# Patient Record
Sex: MALE | Race: OTHER | HISPANIC OR LATINO | URBAN - METROPOLITAN AREA
[De-identification: names, ages, dates, MRNs, and addresses within clinical notes are randomized per-mention and may not be internally consistent; named-entity substitution may affect disease eponyms.]

---

## 2018-11-16 ENCOUNTER — OUTPATIENT (OUTPATIENT)
Dept: OUTPATIENT SERVICES | Facility: HOSPITAL | Age: 48
LOS: 1 days | Discharge: HOME | End: 2018-11-16
Payer: MEDICAID

## 2018-11-16 VITALS
WEIGHT: 212.08 LBS | DIASTOLIC BLOOD PRESSURE: 85 MMHG | OXYGEN SATURATION: 99 % | HEART RATE: 74 BPM | SYSTOLIC BLOOD PRESSURE: 135 MMHG | HEIGHT: 68 IN | RESPIRATION RATE: 16 BRPM | TEMPERATURE: 98 F

## 2018-11-16 DIAGNOSIS — K40.20 BILATERAL INGUINAL HERNIA, WITHOUT OBSTRUCTION OR GANGRENE, NOT SPECIFIED AS RECURRENT: Chronic | ICD-10-CM

## 2018-11-16 DIAGNOSIS — M75.41 IMPINGEMENT SYNDROME OF RIGHT SHOULDER: ICD-10-CM

## 2018-11-16 DIAGNOSIS — S43.491D OTHER SPRAIN OF RIGHT SHOULDER JOINT, SUBSEQUENT ENCOUNTER: ICD-10-CM

## 2018-11-16 LAB
ANION GAP SERPL CALC-SCNC: 7 MMOL/L — SIGNIFICANT CHANGE UP (ref 5–17)
APTT BLD: 29 SEC — SIGNIFICANT CHANGE UP (ref 27.5–36.3)
BASOPHILS # BLD AUTO: 0.08 K/UL — SIGNIFICANT CHANGE UP (ref 0–0.2)
BASOPHILS NFR BLD AUTO: 0.8 % — SIGNIFICANT CHANGE UP (ref 0–2)
BUN SERPL-MCNC: 24 MG/DL — HIGH (ref 7–23)
CALCIUM SERPL-MCNC: 9.4 MG/DL — SIGNIFICANT CHANGE UP (ref 8.5–10.1)
CHLORIDE SERPL-SCNC: 105 MMOL/L — SIGNIFICANT CHANGE UP (ref 96–108)
CO2 SERPL-SCNC: 27 MMOL/L — SIGNIFICANT CHANGE UP (ref 22–31)
CREAT SERPL-MCNC: 1.57 MG/DL — HIGH (ref 0.5–1.3)
EOSINOPHIL # BLD AUTO: 0.1 K/UL — SIGNIFICANT CHANGE UP (ref 0–0.5)
EOSINOPHIL NFR BLD AUTO: 1 % — SIGNIFICANT CHANGE UP (ref 0–6)
GLUCOSE SERPL-MCNC: 95 MG/DL — SIGNIFICANT CHANGE UP (ref 70–99)
HCT VFR BLD CALC: 47.7 % — SIGNIFICANT CHANGE UP (ref 39–50)
HGB BLD-MCNC: 15.9 G/DL — SIGNIFICANT CHANGE UP (ref 13–17)
IMM GRANULOCYTES NFR BLD AUTO: 0.3 % — SIGNIFICANT CHANGE UP (ref 0–1.5)
INR BLD: 1.08 RATIO — SIGNIFICANT CHANGE UP (ref 0.88–1.16)
LYMPHOCYTES # BLD AUTO: 2.4 K/UL — SIGNIFICANT CHANGE UP (ref 1–3.3)
LYMPHOCYTES # BLD AUTO: 24.3 % — SIGNIFICANT CHANGE UP (ref 13–44)
MCHC RBC-ENTMCNC: 30.2 PG — SIGNIFICANT CHANGE UP (ref 27–34)
MCHC RBC-ENTMCNC: 33.3 GM/DL — SIGNIFICANT CHANGE UP (ref 32–36)
MCV RBC AUTO: 90.7 FL — SIGNIFICANT CHANGE UP (ref 80–100)
MONOCYTES # BLD AUTO: 0.76 K/UL — SIGNIFICANT CHANGE UP (ref 0–0.9)
MONOCYTES NFR BLD AUTO: 7.7 % — SIGNIFICANT CHANGE UP (ref 2–14)
NEUTROPHILS # BLD AUTO: 6.52 K/UL — SIGNIFICANT CHANGE UP (ref 1.8–7.4)
NEUTROPHILS NFR BLD AUTO: 65.9 % — SIGNIFICANT CHANGE UP (ref 43–77)
NRBC # BLD: 0 /100 WBCS — SIGNIFICANT CHANGE UP (ref 0–0)
PLATELET # BLD AUTO: 253 K/UL — SIGNIFICANT CHANGE UP (ref 150–400)
POTASSIUM SERPL-MCNC: 4.3 MMOL/L — SIGNIFICANT CHANGE UP (ref 3.5–5.3)
POTASSIUM SERPL-SCNC: 4.3 MMOL/L — SIGNIFICANT CHANGE UP (ref 3.5–5.3)
PROTHROM AB SERPL-ACNC: 12 SEC — SIGNIFICANT CHANGE UP (ref 10–12.9)
RBC # BLD: 5.26 M/UL — SIGNIFICANT CHANGE UP (ref 4.2–5.8)
RBC # FLD: 12.9 % — SIGNIFICANT CHANGE UP (ref 10.3–14.5)
SODIUM SERPL-SCNC: 139 MMOL/L — SIGNIFICANT CHANGE UP (ref 135–145)
WBC # BLD: 9.89 K/UL — SIGNIFICANT CHANGE UP (ref 3.8–10.5)
WBC # FLD AUTO: 9.89 K/UL — SIGNIFICANT CHANGE UP (ref 3.8–10.5)

## 2018-11-16 PROCEDURE — 93010 ELECTROCARDIOGRAM REPORT: CPT

## 2018-11-16 NOTE — H&P PST ADULT - ASSESSMENT
48 year old male presents to PST for  right shoulder arthroscopy with rotator cuff repair with manipulation under anesthesia     Plan:  1. PST instructions given ; NPO post midnight   2. Labs drawn as per surgeon request   3. EZ wash instructions given   4. Medical Evaluation with Dr Araujo

## 2018-11-16 NOTE — H&P PST ADULT - NSANTHOSAYNRD_GEN_A_CORE
No. REE screening performed.  STOP BANG Legend: 0-2 = LOW Risk; 3-4 = INTERMEDIATE Risk; 5-8 = HIGH Risk

## 2018-11-16 NOTE — H&P PST ADULT - HISTORY OF PRESENT ILLNESS
48 year old male PMH of glaucoma, hemachromatosis recently diagnosed 2 months ago  ( phlebotomy monthly for now) ; c/o right shoulder pain with limited ROM; MRI done  he presents to [facility] for  right shoulder arthroscopy with rotator cuff repair with manipulation under anesthesia

## 2018-11-21 PROBLEM — E83.119 HEMOCHROMATOSIS, UNSPECIFIED: Chronic | Status: ACTIVE | Noted: 2018-11-16

## 2018-11-21 PROBLEM — H40.9 UNSPECIFIED GLAUCOMA: Chronic | Status: ACTIVE | Noted: 2018-11-16

## 2018-11-21 PROBLEM — M75.100 UNSPECIFIED ROTATOR CUFF TEAR OR RUPTURE OF UNSPECIFIED SHOULDER, NOT SPECIFIED AS TRAUMATIC: Chronic | Status: ACTIVE | Noted: 2018-11-16

## 2018-11-26 ENCOUNTER — RESULT REVIEW (OUTPATIENT)
Age: 48
End: 2018-11-26

## 2018-11-26 ENCOUNTER — OUTPATIENT (OUTPATIENT)
Dept: OUTPATIENT SERVICES | Facility: HOSPITAL | Age: 48
LOS: 1 days | Discharge: HOME | End: 2018-11-26
Payer: MEDICAID

## 2018-11-26 VITALS
SYSTOLIC BLOOD PRESSURE: 137 MMHG | WEIGHT: 212.08 LBS | RESPIRATION RATE: 15 BRPM | OXYGEN SATURATION: 100 % | HEIGHT: 68 IN | TEMPERATURE: 99 F | HEART RATE: 64 BPM | DIASTOLIC BLOOD PRESSURE: 88 MMHG

## 2018-11-26 VITALS
SYSTOLIC BLOOD PRESSURE: 136 MMHG | TEMPERATURE: 98 F | HEART RATE: 76 BPM | OXYGEN SATURATION: 96 % | DIASTOLIC BLOOD PRESSURE: 79 MMHG | RESPIRATION RATE: 16 BRPM

## 2018-11-26 DIAGNOSIS — K40.20 BILATERAL INGUINAL HERNIA, WITHOUT OBSTRUCTION OR GANGRENE, NOT SPECIFIED AS RECURRENT: Chronic | ICD-10-CM

## 2018-11-26 PROCEDURE — 88304 TISSUE EXAM BY PATHOLOGIST: CPT | Mod: 26

## 2018-11-26 NOTE — PROGRESS NOTE ADULT - SUBJECTIVE AND OBJECTIVE BOX
__Right____ Interscalene Nerve Block Note:  Time out performed, pt awake and alert, sterile prep with chlorhexidine and drape, ultrasound-guided, 21 G 2" echostim needle, good visualization of needle and nerve at all times, 30cc of 0.5% Ropivacaine injected easily, no heme after aspirating every 5cc, no intraneural injection, no paresthesia.  Procedure well tolerated without complications.

## 2018-11-26 NOTE — ASU DISCHARGE PLAN (ADULT/PEDIATRIC). - MEDICATION SUMMARY - MEDICATIONS TO CHANGE
I will SWITCH the dose or number of times a day I take the medications listed below when I get home from the hospital:    oxyCODONE-acetaminophen 5 mg-325 mg oral tablet  -- 1 tab po daily

## 2018-11-26 NOTE — ASU DISCHARGE PLAN (ADULT/PEDIATRIC). - SPECIAL INSTRUCTIONS
Follow up with Dr Aguirre in 10-14 days. Call office for appointment. Take medications as prescribed. Rest, ice, and elevate affected extremity. Keep dressing clean, dry, and intact. Can replace dressing in 72 hours. Place adhesive bandage over incision sites. Nonweight bearing affected extremity with shoulder immobilizer.

## 2018-11-26 NOTE — ASU DISCHARGE PLAN (ADULT/PEDIATRIC). - MEDICATION SUMMARY - MEDICATIONS TO TAKE
I will START or STAY ON the medications listed below when I get home from the hospital:    oxyCODONE-acetaminophen 5 mg-325 mg oral tablet  -- 1 tab(s) by mouth every 4 hours, As Needed -for severe pain MDD:6 tabs per day  -- Caution federal law prohibits the transfer of this drug to any person other  than the person for whom it was prescribed.  May cause drowsiness.  Alcohol may intensify this effect.  Use care when operating dangerous machinery.  This prescription cannot be refilled.  This product contains acetaminophen.  Do not use  with any other product containing acetaminophen to prevent possible liver damage.  Using more of this medication than prescribed may cause serious breathing problems.    -- Indication: For pain    nortriptyline 25 mg oral capsule  -- 1 cap(s) by mouth 1 times a day  -- Indication: For home med    tiZANidine 4 mg oral tablet  -- 1 tab(s) by mouth daily  -- Indication: For home med

## 2018-11-28 LAB — SURGICAL PATHOLOGY FINAL REPORT - CH: SIGNIFICANT CHANGE UP

## 2018-11-29 DIAGNOSIS — M19.011 PRIMARY OSTEOARTHRITIS, RIGHT SHOULDER: ICD-10-CM

## 2018-11-29 DIAGNOSIS — M66.321 SPONTANEOUS RUPTURE OF FLEXOR TENDONS, RIGHT UPPER ARM: ICD-10-CM

## 2018-11-29 DIAGNOSIS — M75.121 COMPLETE ROTATOR CUFF TEAR OR RUPTURE OF RIGHT SHOULDER, NOT SPECIFIED AS TRAUMATIC: ICD-10-CM

## 2018-11-29 DIAGNOSIS — M94.211 CHONDROMALACIA, RIGHT SHOULDER: ICD-10-CM

## 2018-11-29 DIAGNOSIS — E83.119 HEMOCHROMATOSIS, UNSPECIFIED: ICD-10-CM

## 2018-11-29 DIAGNOSIS — M75.41 IMPINGEMENT SYNDROME OF RIGHT SHOULDER: ICD-10-CM

## 2018-11-29 DIAGNOSIS — H40.9 UNSPECIFIED GLAUCOMA: ICD-10-CM

## 2018-11-29 DIAGNOSIS — M75.51 BURSITIS OF RIGHT SHOULDER: ICD-10-CM

## 2019-01-06 ENCOUNTER — EMERGENCY (EMERGENCY)
Facility: HOSPITAL | Age: 49
LOS: 1 days | Discharge: HOME | End: 2019-01-06
Attending: EMERGENCY MEDICINE
Payer: COMMERCIAL

## 2019-01-06 VITALS
SYSTOLIC BLOOD PRESSURE: 137 MMHG | HEART RATE: 94 BPM | DIASTOLIC BLOOD PRESSURE: 90 MMHG | OXYGEN SATURATION: 97 % | TEMPERATURE: 98 F | WEIGHT: 214.95 LBS | RESPIRATION RATE: 18 BRPM | HEIGHT: 69 IN

## 2019-01-06 DIAGNOSIS — K40.20 BILATERAL INGUINAL HERNIA, WITHOUT OBSTRUCTION OR GANGRENE, NOT SPECIFIED AS RECURRENT: Chronic | ICD-10-CM

## 2019-01-06 LAB
ANION GAP SERPL CALC-SCNC: 14 MMOL/L — SIGNIFICANT CHANGE UP (ref 5–17)
BUN SERPL-MCNC: 24 MG/DL — HIGH (ref 8–20)
CALCIUM SERPL-MCNC: 9.6 MG/DL — SIGNIFICANT CHANGE UP (ref 8.6–10.2)
CHLORIDE SERPL-SCNC: 100 MMOL/L — SIGNIFICANT CHANGE UP (ref 98–107)
CK SERPL-CCNC: 75 U/L — SIGNIFICANT CHANGE UP (ref 30–200)
CO2 SERPL-SCNC: 25 MMOL/L — SIGNIFICANT CHANGE UP (ref 22–29)
CREAT SERPL-MCNC: 1.39 MG/DL — HIGH (ref 0.5–1.3)
GLUCOSE SERPL-MCNC: 129 MG/DL — HIGH (ref 70–115)
HCT VFR BLD CALC: 47.1 % — SIGNIFICANT CHANGE UP (ref 42–52)
HGB BLD-MCNC: 15.8 G/DL — SIGNIFICANT CHANGE UP (ref 14–18)
MCHC RBC-ENTMCNC: 31.1 PG — HIGH (ref 27–31)
MCHC RBC-ENTMCNC: 33.5 G/DL — SIGNIFICANT CHANGE UP (ref 32–36)
MCV RBC AUTO: 92.7 FL — SIGNIFICANT CHANGE UP (ref 80–94)
PLATELET # BLD AUTO: 166 K/UL — SIGNIFICANT CHANGE UP (ref 150–400)
POTASSIUM SERPL-MCNC: 4.6 MMOL/L — SIGNIFICANT CHANGE UP (ref 3.5–5.3)
POTASSIUM SERPL-SCNC: 4.6 MMOL/L — SIGNIFICANT CHANGE UP (ref 3.5–5.3)
RBC # BLD: 5.08 M/UL — SIGNIFICANT CHANGE UP (ref 4.6–6.2)
RBC # FLD: 13.6 % — SIGNIFICANT CHANGE UP (ref 11–15.6)
SODIUM SERPL-SCNC: 139 MMOL/L — SIGNIFICANT CHANGE UP (ref 135–145)
WBC # BLD: 13.5 K/UL — HIGH (ref 4.8–10.8)
WBC # FLD AUTO: 13.5 K/UL — HIGH (ref 4.8–10.8)

## 2019-01-06 PROCEDURE — 73590 X-RAY EXAM OF LOWER LEG: CPT

## 2019-01-06 PROCEDURE — 36415 COLL VENOUS BLD VENIPUNCTURE: CPT

## 2019-01-06 PROCEDURE — 93971 EXTREMITY STUDY: CPT | Mod: 26,LT

## 2019-01-06 PROCEDURE — 99284 EMERGENCY DEPT VISIT MOD MDM: CPT

## 2019-01-06 PROCEDURE — T1013: CPT

## 2019-01-06 PROCEDURE — 80048 BASIC METABOLIC PNL TOTAL CA: CPT

## 2019-01-06 PROCEDURE — 73590 X-RAY EXAM OF LOWER LEG: CPT | Mod: 26,LT

## 2019-01-06 PROCEDURE — 85027 COMPLETE CBC AUTOMATED: CPT

## 2019-01-06 PROCEDURE — 82550 ASSAY OF CK (CPK): CPT

## 2019-01-06 PROCEDURE — 93971 EXTREMITY STUDY: CPT

## 2019-01-06 RX ORDER — APIXABAN 2.5 MG/1
2 TABLET, FILM COATED ORAL
Qty: 28 | Refills: 0 | OUTPATIENT
Start: 2019-01-06 | End: 2019-01-12

## 2019-01-06 RX ORDER — ACETAMINOPHEN 500 MG
650 TABLET ORAL ONCE
Qty: 0 | Refills: 0 | Status: COMPLETED | OUTPATIENT
Start: 2019-01-06 | End: 2019-01-06

## 2019-01-06 RX ORDER — APIXABAN 2.5 MG/1
10 TABLET, FILM COATED ORAL ONCE
Qty: 0 | Refills: 0 | Status: COMPLETED | OUTPATIENT
Start: 2019-01-06 | End: 2019-01-06

## 2019-01-06 RX ADMIN — APIXABAN 10 MILLIGRAM(S): 2.5 TABLET, FILM COATED ORAL at 15:54

## 2019-01-06 NOTE — ED STATDOCS - PROGRESS NOTE DETAILS
Pt seen and evaluated. Agree with HPI/PE and plan Pt seen and evaluated. Pt with large, swollen and tender calf. NO crepitus, erythema or warmth. Pain worse with ankle extension. Denies injury, currently nonworking. Will add labs to include cpk for possible early compartment syndrome. XR to eval for subq air. Lab results noted. Cr noted 1.57 Nov 2018. Pt aware of renal insuff. +DVT Pt made aware- will start on Eliquis 10mg BID x 7 days and instructed to f/u with PMD/ Vascular for extended treatement. Pt educated on complications and instructed to return to ED with CP, SOB, or palp. Pt verbalizes understanding

## 2019-01-06 NOTE — ED STATDOCS - ATTENDING CONTRIBUTION TO CARE
Travis: I performed a face to face bedside interview with patient regarding history of present illness, review of symptoms and past medical history. I completed an independent physical exam and ordered tests/medications as needed.  I have discussed patient's plan of care with advanced care provider. The advanced care provider assisted in  executing the discussed plan. I was available for any questions or issues that may have arose during the execution of the plan of care.

## 2019-01-06 NOTE — ED STATDOCS - OBJECTIVE STATEMENT
47 y/o M pt with hx of hematochormatosis presents to ED c/o sudden left leg swelling and pain x 4 days. The swelling has progressed over the last few days; presented to urgent care and was told to present to ED for US of LE. Denies back pain, dysuria or tingling in legs. Denies history of blood clots. No recent travel. No further complaints at this time.

## 2019-01-06 NOTE — ED STATDOCS - PMH
Glaucoma    Hemochromatosis    Torn rotator cuff  right shoulder Glaucoma    Hemochromatosis    Renal insufficiency    Torn rotator cuff  right shoulder

## 2019-01-06 NOTE — ED ADULT TRIAGE NOTE - CHIEF COMPLAINT QUOTE
"I went to [facility] today for left calf pain and I feel like it might be a pinched nerve and they sent me here to get a sonogram of my leg. " Pt A & OX4,.

## 2019-01-06 NOTE — ED STATDOCS - NS ED ROS FT
ROS: No fever/chills.  No chest painNo SOB/cough/. No abdominal pain, N/V/D,No dysuria/frequency.  No headache. No Dizziness.    No rashes  No numbness/weakness + LE swelling and pain

## 2019-01-06 NOTE — ED STATDOCS - PHYSICAL EXAMINATION
Gen: No acute distress, non toxic  HEENT: Mucous membranes moist, pink conjunctivae, EOMI  CV: RRR  Resp: CTAB, normal rate and effort  GI: Abdomen soft, NT, ND. No rebound, no guarding  Neuro: A&O x 3, moving all 4 extremities  Musculoskeletal: Tenderness left calf, mild swelling, no erythema or warmth, full ROM and neurovascularly intact

## 2019-01-07 ENCOUNTER — MEDICATION RENEWAL (OUTPATIENT)
Age: 49
End: 2019-01-07

## 2019-01-14 ENCOUNTER — APPOINTMENT (OUTPATIENT)
Dept: VASCULAR SURGERY | Facility: CLINIC | Age: 49
End: 2019-01-14
Payer: MEDICAID

## 2019-01-14 VITALS
HEART RATE: 98 BPM | WEIGHT: 209 LBS | DIASTOLIC BLOOD PRESSURE: 77 MMHG | HEIGHT: 69 IN | BODY MASS INDEX: 30.96 KG/M2 | OXYGEN SATURATION: 97 % | TEMPERATURE: 98.5 F | SYSTOLIC BLOOD PRESSURE: 123 MMHG

## 2019-01-14 DIAGNOSIS — Z82.49 FAMILY HISTORY OF ISCHEMIC HEART DISEASE AND OTHER DISEASES OF THE CIRCULATORY SYSTEM: ICD-10-CM

## 2019-01-14 DIAGNOSIS — Z87.39 PERSONAL HISTORY OF OTHER DISEASES OF THE MUSCULOSKELETAL SYSTEM AND CONNECTIVE TISSUE: ICD-10-CM

## 2019-01-14 DIAGNOSIS — Z83.3 FAMILY HISTORY OF DIABETES MELLITUS: ICD-10-CM

## 2019-01-14 DIAGNOSIS — Z78.9 OTHER SPECIFIED HEALTH STATUS: ICD-10-CM

## 2019-01-14 PROBLEM — N28.9 DISORDER OF KIDNEY AND URETER, UNSPECIFIED: Chronic | Status: ACTIVE | Noted: 2019-01-06

## 2019-01-14 PROCEDURE — 99203 OFFICE O/P NEW LOW 30 MIN: CPT

## 2019-01-18 PROBLEM — Z82.49 FAMILY HISTORY OF HYPERTENSION: Status: ACTIVE | Noted: 2019-01-14

## 2019-01-18 PROBLEM — Z83.3 FAMILY HISTORY OF DIABETES MELLITUS: Status: ACTIVE | Noted: 2019-01-14

## 2019-01-18 PROBLEM — Z78.9 NON-SMOKER: Status: ACTIVE | Noted: 2019-01-14

## 2019-01-18 PROBLEM — Z87.39 HISTORY OF ARTHRITIS: Status: RESOLVED | Noted: 2019-01-14 | Resolved: 2019-01-18

## 2019-01-18 PROBLEM — Z82.49 FAMILY HISTORY OF MYOCARDIAL INFARCTION: Status: ACTIVE | Noted: 2019-01-14

## 2019-01-18 NOTE — PHYSICAL EXAM
[2+] : left 2+ [Ankle Swelling (On Exam)] : present [Ankle Swelling On The Left] : moderate [Alert] : alert [Oriented to Person] : oriented to person [Oriented to Place] : oriented to place [Oriented to Time] : oriented to time [Calm] : calm [Varicose Veins Of Lower Extremities] : not present [] : not present [de-identified] : SHILPA, NAD [de-identified] : EDU GILMORE [de-identified] : supple, midline [de-identified] : nonlabored breathing [de-identified] : vikyr [de-identified] : LLE with mild to moderate edema without discoloration or signs of phlegmasia. Palpable pedal pulses bilaterally [de-identified] : no gross focal motor or sensory deficits

## 2019-01-18 NOTE — HISTORY OF PRESENT ILLNESS
[FreeTextEntry1] : 48yoM with pmhx of hemochromatosis, arthritis, chronic back pain presenting to the office for DVT involving the left popliteal vein extending into the posterior tibial and peroneal veins. Also seen were thrombosed anterior intramuscular calf veins. Patient states this happened on Jan 1. He first noted that his left lower extremity was hurting but thought it was more of a muscle cramp. On Jan 6th, he went to the hospital where he was found to have a LLE DVT. Patient denies any recent travels. Re his hemochromatosis, donates blood transfususions monthly for therapy, with the next one due tomorrow. Today, he states his pain is still present, but it has significantly improved. He was prescribed eliquis 5mg BID and has since run out from his hospital discharge. Denies f/c/n/v/cp/sob

## 2019-02-27 ENCOUNTER — APPOINTMENT (OUTPATIENT)
Dept: RHEUMATOLOGY | Facility: CLINIC | Age: 49
End: 2019-02-27
Payer: MEDICAID

## 2019-02-27 VITALS
DIASTOLIC BLOOD PRESSURE: 66 MMHG | SYSTOLIC BLOOD PRESSURE: 126 MMHG | TEMPERATURE: 98.5 F | HEIGHT: 69 IN | WEIGHT: 220 LBS | OXYGEN SATURATION: 89 % | HEART RATE: 99 BPM | BODY MASS INDEX: 32.58 KG/M2 | RESPIRATION RATE: 17 BRPM

## 2019-02-27 DIAGNOSIS — H04.123 DRY EYE SYNDROME OF BILATERAL LACRIMAL GLANDS: ICD-10-CM

## 2019-02-27 DIAGNOSIS — R68.2 DRY MOUTH, UNSPECIFIED: ICD-10-CM

## 2019-02-27 PROCEDURE — 99244 OFF/OP CNSLTJ NEW/EST MOD 40: CPT | Mod: 25

## 2019-02-27 PROCEDURE — 36415 COLL VENOUS BLD VENIPUNCTURE: CPT

## 2019-04-12 LAB
A PHAGOCYTOPH IGG TITR SER IF: NORMAL TITER
ALBUMIN SERPL ELPH-MCNC: 4.5 G/DL
ALP BLD-CCNC: 91 U/L
ALT SERPL-CCNC: 28 U/L
ANA SER IF-ACNC: NEGATIVE
ANION GAP SERPL CALC-SCNC: 13 MMOL/L
AST SERPL-CCNC: 17 U/L
B BURGDOR AB SER QL IA: NEGATIVE
B BURGDOR AB SER-IMP: NEGATIVE
B BURGDOR IGM PATRN SER IB-IMP: NEGATIVE
B BURGDOR18/20KD IGM SER QL IB: NORMAL
B BURGDOR18KD IGG SER QL IB: NORMAL
B BURGDOR23KD IGG SER QL IB: NORMAL
B BURGDOR23KD IGM SER QL IB: NORMAL
B BURGDOR28KD AB SER QL IB: NORMAL
B BURGDOR28KD IGG SER QL IB: NORMAL
B BURGDOR30KD AB SER QL IB: NORMAL
B BURGDOR30KD IGG SER QL IB: NORMAL
B BURGDOR31KD IGG SER QL IB: NORMAL
B BURGDOR31KD IGM SER QL IB: NORMAL
B BURGDOR39KD IGG SER QL IB: NORMAL
B BURGDOR39KD IGM SER QL IB: NORMAL
B BURGDOR41KD IGG SER QL IB: PRESENT
B BURGDOR41KD IGM SER QL IB: NORMAL
B BURGDOR45KD AB SER QL IB: NORMAL
B BURGDOR45KD IGG SER QL IB: NORMAL
B BURGDOR58KD AB SER QL IB: NORMAL
B BURGDOR58KD IGG SER QL IB: NORMAL
B BURGDOR66KD IGG SER QL IB: PRESENT
B BURGDOR66KD IGM SER QL IB: NORMAL
B BURGDOR93KD IGG SER QL IB: NORMAL
B BURGDOR93KD IGM SER QL IB: NORMAL
B MICROTI IGG TITR SER: NORMAL TITER
BASOPHILS # BLD AUTO: 0.06 K/UL
BASOPHILS NFR BLD AUTO: 0.4 %
BILIRUB SERPL-MCNC: 0.2 MG/DL
BUN SERPL-MCNC: 15 MG/DL
CALCIUM SERPL-MCNC: 10.2 MG/DL
CCP AB SER IA-ACNC: <8 UNITS
CHLORIDE SERPL-SCNC: 101 MMOL/L
CO2 SERPL-SCNC: 28 MMOL/L
CREAT SERPL-MCNC: 1.23 MG/DL
CRP SERPL-MCNC: 0.42 MG/DL
E CHAFFEENSIS IGG TITR SER IF: NORMAL TITER
EOSINOPHIL # BLD AUTO: 0.02 K/UL
EOSINOPHIL NFR BLD AUTO: 0.1 %
ERYTHROCYTE [SEDIMENTATION RATE] IN BLOOD BY WESTERGREN METHOD: 34 MM/HR
FERRITIN SERPL-MCNC: 312 NG/ML
GLUCOSE SERPL-MCNC: 166 MG/DL
HCT VFR BLD CALC: 46.8 %
HGB BLD-MCNC: 14.6 G/DL
IMM GRANULOCYTES NFR BLD AUTO: 1.3 %
IRON SATN MFR SERPL: 22 %
IRON SERPL-MCNC: 64 UG/DL
LYMPHOCYTES # BLD AUTO: 2.07 K/UL
LYMPHOCYTES NFR BLD AUTO: 12.8 %
MAN DIFF?: NORMAL
MCHC RBC-ENTMCNC: 29.4 PG
MCHC RBC-ENTMCNC: 31.2 GM/DL
MCV RBC AUTO: 94.4 FL
MONOCYTES # BLD AUTO: 0.9 K/UL
MONOCYTES NFR BLD AUTO: 5.5 %
NEUTROPHILS # BLD AUTO: 12.96 K/UL
NEUTROPHILS NFR BLD AUTO: 79.9 %
PLATELET # BLD AUTO: 334 K/UL
POTASSIUM SERPL-SCNC: 5.2 MMOL/L
PROT SERPL-MCNC: 7 G/DL
RBC # BLD: 4.96 M/UL
RBC # FLD: 13.5 %
RF+CCP IGG SER-IMP: NEGATIVE
RHEUMATOID FACT SER QL: 14 IU/ML
SODIUM SERPL-SCNC: 141 MMOL/L
TIBC SERPL-MCNC: 295 UG/DL
UIBC SERPL-MCNC: 231 UG/DL
URATE SERPL-MCNC: 7.2 MG/DL
WBC # FLD AUTO: 16.22 K/UL

## 2019-04-15 ENCOUNTER — APPOINTMENT (OUTPATIENT)
Dept: VASCULAR SURGERY | Facility: CLINIC | Age: 49
End: 2019-04-15
Payer: MEDICAID

## 2019-04-15 VITALS
TEMPERATURE: 98.4 F | DIASTOLIC BLOOD PRESSURE: 85 MMHG | OXYGEN SATURATION: 97 % | WEIGHT: 221 LBS | HEIGHT: 69 IN | HEART RATE: 61 BPM | BODY MASS INDEX: 32.73 KG/M2 | SYSTOLIC BLOOD PRESSURE: 126 MMHG

## 2019-04-15 PROCEDURE — 93971 EXTREMITY STUDY: CPT

## 2019-04-15 PROCEDURE — 99214 OFFICE O/P EST MOD 30 MIN: CPT

## 2019-04-19 NOTE — HISTORY OF PRESENT ILLNESS
[de-identified] : Last seen 3 months ago in January 2019. In interval, he's doing well. Has no complaints. He continues on eliquis and has been compliant. He denies any LE pain or swelling. He continues with his monthly therapeutic blood transfusions for his hemochromatosis. He saw a rheumatologist a few weeks back for chronic joint arthralgias and is pending a work up. [FreeTextEntry1] : 48yoM with pmhx of hemochromatosis, arthritis, chronic back pain presenting to the office for DVT involving the left popliteal vein extending into the posterior tibial and peroneal veins. Also seen were thrombosed anterior intramuscular calf veins. Patient states this happened on Jan 1. He first noted that his left lower extremity was hurting but thought it was more of a muscle cramp. On Jan 6th, he went to the hospital where he was found to have a LLE DVT. Patient denies any recent travels. Re his hemochromatosis, donates blood transfusions monthly for therapy, with the next one due tomorrow. Today, he states his pain is still present, but it has significantly improved. He was prescribed eliquis 5mg BID and has since run out from his hospital discharge. Denies f/c/n/v/cp/sob

## 2019-04-19 NOTE — PHYSICAL EXAM
[Ankle Swelling (On Exam)] : present [2+] : right 2+ [Ankle Swelling On The Left] : of the left ankle [Alert] : alert [Oriented to Person] : oriented to person [Oriented to Place] : oriented to place [Oriented to Time] : oriented to time [Calm] : calm [Ankle Swelling On The Right] : mild [] : not present [Varicose Veins Of Lower Extremities] : not present [de-identified] : SHILPA, NAD [de-identified] : supple, midline [de-identified] : EDU GILMORE [de-identified] : nonlabored breathing [de-identified] : vikyr [de-identified] : LLE with mild to moderate edema without discoloration or signs of phlegmasia. Palpable pedal pulses bilaterally [de-identified] : no gross focal motor or sensory deficits

## 2019-04-24 ENCOUNTER — APPOINTMENT (OUTPATIENT)
Dept: RHEUMATOLOGY | Facility: CLINIC | Age: 49
End: 2019-04-24
Payer: MEDICAID

## 2019-04-24 VITALS
HEIGHT: 69 IN | SYSTOLIC BLOOD PRESSURE: 128 MMHG | BODY MASS INDEX: 32.58 KG/M2 | DIASTOLIC BLOOD PRESSURE: 82 MMHG | RESPIRATION RATE: 17 BRPM | TEMPERATURE: 98.1 F | HEART RATE: 71 BPM | WEIGHT: 220 LBS | OXYGEN SATURATION: 98 %

## 2019-04-24 DIAGNOSIS — Z86.718 PERSONAL HISTORY OF OTHER VENOUS THROMBOSIS AND EMBOLISM: ICD-10-CM

## 2019-04-24 PROCEDURE — 99214 OFFICE O/P EST MOD 30 MIN: CPT

## 2019-04-24 RX ORDER — APIXABAN 5 MG/1
5 TABLET, FILM COATED ORAL TWICE DAILY
Qty: 180 | Refills: 1 | Status: DISCONTINUED | COMMUNITY
Start: 2019-01-07 | End: 2019-04-24

## 2019-04-25 ENCOUNTER — OUTPATIENT (OUTPATIENT)
Dept: OUTPATIENT SERVICES | Facility: HOSPITAL | Age: 49
LOS: 1 days | End: 2019-04-25
Payer: COMMERCIAL

## 2019-04-25 VITALS
RESPIRATION RATE: 16 BRPM | HEIGHT: 69 IN | SYSTOLIC BLOOD PRESSURE: 138 MMHG | DIASTOLIC BLOOD PRESSURE: 93 MMHG | OXYGEN SATURATION: 99 % | WEIGHT: 214.07 LBS | TEMPERATURE: 99 F | HEART RATE: 68 BPM

## 2019-04-25 DIAGNOSIS — I80.00 PHLEBITIS AND THROMBOPHLEBITIS OF SUPERFICIAL VESSELS OF UNSPECIFIED LOWER EXTREMITY: ICD-10-CM

## 2019-04-25 DIAGNOSIS — S46.012D STRAIN OF MUSCLE(S) AND TENDON(S) OF THE ROTATOR CUFF OF LEFT SHOULDER, SUBSEQUENT ENCOUNTER: ICD-10-CM

## 2019-04-25 DIAGNOSIS — K40.20 BILATERAL INGUINAL HERNIA, WITHOUT OBSTRUCTION OR GANGRENE, NOT SPECIFIED AS RECURRENT: Chronic | ICD-10-CM

## 2019-04-25 DIAGNOSIS — Z01.818 ENCOUNTER FOR OTHER PREPROCEDURAL EXAMINATION: ICD-10-CM

## 2019-04-25 DIAGNOSIS — M75.42 IMPINGEMENT SYNDROME OF LEFT SHOULDER: ICD-10-CM

## 2019-04-25 LAB
ALBUMIN SERPL ELPH-MCNC: 3.9 G/DL — SIGNIFICANT CHANGE UP (ref 3.3–5)
ALP SERPL-CCNC: 86 U/L — SIGNIFICANT CHANGE UP (ref 40–120)
ALT FLD-CCNC: 36 U/L — SIGNIFICANT CHANGE UP (ref 12–78)
ANION GAP SERPL CALC-SCNC: 3 MMOL/L — LOW (ref 5–17)
APTT BLD: 24.9 SEC — LOW (ref 27.5–36.3)
AST SERPL-CCNC: 12 U/L — LOW (ref 15–37)
BILIRUB SERPL-MCNC: 0.4 MG/DL — SIGNIFICANT CHANGE UP (ref 0.2–1.2)
BUN SERPL-MCNC: 25 MG/DL — HIGH (ref 7–23)
CALCIUM SERPL-MCNC: 9.2 MG/DL — SIGNIFICANT CHANGE UP (ref 8.5–10.1)
CHLORIDE SERPL-SCNC: 106 MMOL/L — SIGNIFICANT CHANGE UP (ref 96–108)
CO2 SERPL-SCNC: 31 MMOL/L — SIGNIFICANT CHANGE UP (ref 22–31)
CREAT SERPL-MCNC: 1.4 MG/DL — HIGH (ref 0.5–1.3)
GLUCOSE SERPL-MCNC: 118 MG/DL — HIGH (ref 70–99)
HCT VFR BLD CALC: 45.7 % — SIGNIFICANT CHANGE UP (ref 39–50)
HGB BLD-MCNC: 14.7 G/DL — SIGNIFICANT CHANGE UP (ref 13–17)
INR BLD: 1.04 RATIO — SIGNIFICANT CHANGE UP (ref 0.88–1.16)
MCHC RBC-ENTMCNC: 29.3 PG — SIGNIFICANT CHANGE UP (ref 27–34)
MCHC RBC-ENTMCNC: 32.2 GM/DL — SIGNIFICANT CHANGE UP (ref 32–36)
MCV RBC AUTO: 91.2 FL — SIGNIFICANT CHANGE UP (ref 80–100)
NRBC # BLD: 0 /100 WBCS — SIGNIFICANT CHANGE UP (ref 0–0)
PLATELET # BLD AUTO: 287 K/UL — SIGNIFICANT CHANGE UP (ref 150–400)
POTASSIUM SERPL-MCNC: 4.6 MMOL/L — SIGNIFICANT CHANGE UP (ref 3.5–5.3)
POTASSIUM SERPL-SCNC: 4.6 MMOL/L — SIGNIFICANT CHANGE UP (ref 3.5–5.3)
PROT SERPL-MCNC: 7.7 G/DL — SIGNIFICANT CHANGE UP (ref 6–8.3)
PROTHROM AB SERPL-ACNC: 11.8 SEC — SIGNIFICANT CHANGE UP (ref 10–12.9)
RBC # BLD: 5.01 M/UL — SIGNIFICANT CHANGE UP (ref 4.2–5.8)
RBC # FLD: 13.9 % — SIGNIFICANT CHANGE UP (ref 10.3–14.5)
SODIUM SERPL-SCNC: 140 MMOL/L — SIGNIFICANT CHANGE UP (ref 135–145)
WBC # BLD: 12.94 K/UL — HIGH (ref 3.8–10.5)
WBC # FLD AUTO: 12.94 K/UL — HIGH (ref 3.8–10.5)

## 2019-04-25 PROCEDURE — 85610 PROTHROMBIN TIME: CPT

## 2019-04-25 PROCEDURE — 93005 ELECTROCARDIOGRAM TRACING: CPT

## 2019-04-25 PROCEDURE — 80053 COMPREHEN METABOLIC PANEL: CPT

## 2019-04-25 PROCEDURE — 93010 ELECTROCARDIOGRAM REPORT: CPT | Mod: NC

## 2019-04-25 PROCEDURE — G0463: CPT

## 2019-04-25 PROCEDURE — 85027 COMPLETE CBC AUTOMATED: CPT

## 2019-04-25 PROCEDURE — 85730 THROMBOPLASTIN TIME PARTIAL: CPT

## 2019-04-25 PROCEDURE — 36415 COLL VENOUS BLD VENIPUNCTURE: CPT

## 2019-04-25 RX ORDER — NORTRIPTYLINE HYDROCHLORIDE 10 MG/1
1 CAPSULE ORAL
Qty: 0 | Refills: 0 | COMMUNITY

## 2019-04-25 RX ORDER — TIZANIDINE 4 MG/1
2 TABLET ORAL
Qty: 0 | Refills: 0 | COMMUNITY

## 2019-04-25 NOTE — H&P PST ADULT - MUSCULOSKELETAL
details… detailed exam diminished strength/no calf tenderness/no joint swelling/no joint erythema/decreased ROM due to pain/LUE, asymmetry noted, atrophy +/no joint warmth

## 2019-04-25 NOTE — H&P PST ADULT - NSICDXPASTMEDICALHX_GEN_ALL_CORE_FT
PAST MEDICAL HISTORY:  Depression     DVT, lower extremity     GERD without esophagitis     Glaucoma     Hemochromatosis     Renal insufficiency     Torn rotator cuff right shoulder

## 2019-04-25 NOTE — H&P PST ADULT - NEGATIVE PSYCHIATRIC SYMPTOMS
no insomnia/no paranoia/no hyperactivity/no visual hallucinations/no suicidal ideation/no memory loss/no mood swings/no agitation/no auditory hallucinations

## 2019-04-25 NOTE — H&P PST ADULT - NSICDXPROBLEM_GEN_ALL_CORE_FT
PROBLEM DIAGNOSES  Problem: Impingement syndrome of left shoulder  Assessment and Plan:  Pt evaluated for a scheduled left shoulder arthroscopy with rotator cuff repair on 5/3/19.   Pre-op instructions provided including NPO status, no NSAIDs starting today. Verbalized understanding. States had [facility] and hematology eval this wk. will request records.   c/w medications as ordered, omeprazole, duloxetine, zoloft and tramadol take in am dos w/ a sip of water. PROBLEM DIAGNOSES  Problem: Impingement syndrome of left shoulder  Assessment and Plan:  Pt evaluated for a scheduled left shoulder arthroscopy with rotator cuff repair on 5/3/19.   Pre-op instructions provided including NPO status, no NSAIDs starting today. Verbalized understanding. States had [facility], vascular eval and hematology eval done this wk. will request records.   c/w medications as ordered, omeprazole, duloxetine, zoloft and tramadol take in am dos w/ a sip of water.

## 2019-04-25 NOTE — H&P PST ADULT - ASSESSMENT
DX:  impingement syndrome of left shoulder strain of muscle(s) and tendon(s) of the rotator cuff of left shoulder, subsequent encounter and to be evaluated for a scheduled left shoulder arthroscopy with rotator cuff repair on 5/3/19.

## 2019-04-25 NOTE — H&P PST ADULT - MUSCULOSKELETAL COMMENTS
DX:  impingement syndrome of left shoulder strain of muscle(s) and tendon(s) of the rotator cuff of left shoulder DX:  impingement syndrome of left shoulder strain of muscle(s) and tendon(s) of the rotator cuff of left shoulder, subsequent encounter
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
## 2019-04-25 NOTE — H&P PST ADULT - NEGATIVE NEUROLOGICAL SYMPTOMS
no vertigo/no difficulty walking/no facial palsy/no tremors/no confusion/no syncope/no loss of consciousness/no hemiparesis/no headache/no weakness/no generalized seizures/no focal seizures

## 2019-04-25 NOTE — H&P PST ADULT - NSANTHOSAYNRD_GEN_A_CORE
Never tested/No. REE screening performed.  STOP BANG Legend: 0-2 = LOW Risk; 3-4 = INTERMEDIATE Risk; 5-8 = HIGH Risk

## 2019-04-29 ENCOUNTER — RX CHANGE (OUTPATIENT)
Age: 49
End: 2019-04-29

## 2019-05-02 ENCOUNTER — TRANSCRIPTION ENCOUNTER (OUTPATIENT)
Age: 49
End: 2019-05-02

## 2019-05-03 ENCOUNTER — OUTPATIENT (OUTPATIENT)
Dept: OUTPATIENT SERVICES | Facility: HOSPITAL | Age: 49
LOS: 1 days | End: 2019-05-03
Payer: COMMERCIAL

## 2019-05-03 ENCOUNTER — RESULT REVIEW (OUTPATIENT)
Age: 49
End: 2019-05-03

## 2019-05-03 VITALS
HEART RATE: 61 BPM | SYSTOLIC BLOOD PRESSURE: 136 MMHG | WEIGHT: 214.07 LBS | DIASTOLIC BLOOD PRESSURE: 90 MMHG | OXYGEN SATURATION: 96 % | HEIGHT: 69 IN | RESPIRATION RATE: 14 BRPM | TEMPERATURE: 98 F

## 2019-05-03 VITALS
RESPIRATION RATE: 14 BRPM | OXYGEN SATURATION: 96 % | SYSTOLIC BLOOD PRESSURE: 150 MMHG | DIASTOLIC BLOOD PRESSURE: 90 MMHG | HEART RATE: 74 BPM

## 2019-05-03 DIAGNOSIS — M75.42 IMPINGEMENT SYNDROME OF LEFT SHOULDER: ICD-10-CM

## 2019-05-03 DIAGNOSIS — K40.20 BILATERAL INGUINAL HERNIA, WITHOUT OBSTRUCTION OR GANGRENE, NOT SPECIFIED AS RECURRENT: Chronic | ICD-10-CM

## 2019-05-03 DIAGNOSIS — S46.012D STRAIN OF MUSCLE(S) AND TENDON(S) OF THE ROTATOR CUFF OF LEFT SHOULDER, SUBSEQUENT ENCOUNTER: ICD-10-CM

## 2019-05-03 DIAGNOSIS — Z98.890 OTHER SPECIFIED POSTPROCEDURAL STATES: Chronic | ICD-10-CM

## 2019-05-03 DIAGNOSIS — Z01.818 ENCOUNTER FOR OTHER PREPROCEDURAL EXAMINATION: ICD-10-CM

## 2019-05-03 PROCEDURE — 88304 TISSUE EXAM BY PATHOLOGIST: CPT

## 2019-05-03 PROCEDURE — 88304 TISSUE EXAM BY PATHOLOGIST: CPT | Mod: 26

## 2019-05-03 PROCEDURE — 29827 SHO ARTHRS SRG RT8TR CUF RPR: CPT | Mod: LT

## 2019-05-03 PROCEDURE — C1713: CPT

## 2019-05-03 PROCEDURE — 29828 SHO ARTHRS SRG BICP TENODSIS: CPT | Mod: LT

## 2019-05-03 PROCEDURE — 29826 SHO ARTHRS SRG DECOMPRESSION: CPT | Mod: LT

## 2019-05-03 RX ORDER — HYDROMORPHONE HYDROCHLORIDE 2 MG/ML
0.5 INJECTION INTRAMUSCULAR; INTRAVENOUS; SUBCUTANEOUS
Qty: 0 | Refills: 0 | Status: DISCONTINUED | OUTPATIENT
Start: 2019-05-03 | End: 2019-05-03

## 2019-05-03 RX ORDER — OMEPRAZOLE 10 MG/1
1 CAPSULE, DELAYED RELEASE ORAL
Qty: 0 | Refills: 0 | COMMUNITY

## 2019-05-03 RX ORDER — DULOXETINE HYDROCHLORIDE 30 MG/1
1 CAPSULE, DELAYED RELEASE ORAL
Qty: 0 | Refills: 0 | COMMUNITY

## 2019-05-03 RX ORDER — SERTRALINE 25 MG/1
1 TABLET, FILM COATED ORAL
Qty: 0 | Refills: 0 | COMMUNITY

## 2019-05-03 RX ORDER — TRAMADOL HYDROCHLORIDE 50 MG/1
1 TABLET ORAL
Qty: 0 | Refills: 0 | COMMUNITY

## 2019-05-03 RX ORDER — OXYCODONE HYDROCHLORIDE 5 MG/1
5 TABLET ORAL ONCE
Qty: 0 | Refills: 0 | Status: DISCONTINUED | OUTPATIENT
Start: 2019-05-03 | End: 2019-05-03

## 2019-05-03 RX ORDER — ONDANSETRON 8 MG/1
4 TABLET, FILM COATED ORAL ONCE
Qty: 0 | Refills: 0 | Status: DISCONTINUED | OUTPATIENT
Start: 2019-05-03 | End: 2019-05-03

## 2019-05-03 RX ORDER — CEFAZOLIN SODIUM 1 G
2000 VIAL (EA) INJECTION ONCE
Qty: 0 | Refills: 0 | Status: DISCONTINUED | OUTPATIENT
Start: 2019-05-03 | End: 2019-05-03

## 2019-05-03 RX ORDER — SODIUM CHLORIDE 9 MG/ML
1000 INJECTION, SOLUTION INTRAVENOUS
Qty: 0 | Refills: 0 | Status: DISCONTINUED | OUTPATIENT
Start: 2019-05-03 | End: 2019-05-03

## 2019-05-03 RX ORDER — APIXABAN 2.5 MG/1
1 TABLET, FILM COATED ORAL
Qty: 0 | Refills: 0 | COMMUNITY

## 2019-05-03 RX ADMIN — HYDROMORPHONE HYDROCHLORIDE 0.5 MILLIGRAM(S): 2 INJECTION INTRAMUSCULAR; INTRAVENOUS; SUBCUTANEOUS at 09:29

## 2019-05-03 RX ADMIN — SODIUM CHLORIDE 100 MILLILITER(S): 9 INJECTION, SOLUTION INTRAVENOUS at 09:12

## 2019-05-03 RX ADMIN — HYDROMORPHONE HYDROCHLORIDE 0.5 MILLIGRAM(S): 2 INJECTION INTRAMUSCULAR; INTRAVENOUS; SUBCUTANEOUS at 09:08

## 2019-05-03 RX ADMIN — HYDROMORPHONE HYDROCHLORIDE 0.5 MILLIGRAM(S): 2 INJECTION INTRAMUSCULAR; INTRAVENOUS; SUBCUTANEOUS at 09:18

## 2019-05-03 RX ADMIN — OXYCODONE HYDROCHLORIDE 5 MILLIGRAM(S): 5 TABLET ORAL at 09:38

## 2019-05-03 NOTE — ASU DISCHARGE PLAN (ADULT/PEDIATRIC) - CALL YOUR DOCTOR IF YOU HAVE ANY OF THE FOLLOWING:
Bleeding that does not stop/Swelling that gets worse/Fever greater than (need to indicate Fahrenheit or Celsius) Fever greater than (need to indicate Fahrenheit or Celsius)/Bleeding that does not stop/Swelling that gets worse/Numbness, tingling, color or temperature change to extremity

## 2019-05-03 NOTE — ASU DISCHARGE PLAN (ADULT/PEDIATRIC) - PAIN MANAGEMENT
Prescriptions electronically submitted to pharmacy from [facility] Prescription called to pharmacy/Prescriptions electronically submitted to pharmacy from [facility]

## 2019-05-03 NOTE — ASU DISCHARGE PLAN (ADULT/PEDIATRIC) - ASU DC SPECIAL INSTRUCTIONSFT
Shoulder Arthroscopy Instructions (and/or Arthroscopic Rotator Cuff Repair)    1) Your Shoulder will swell over the next 48hours and you can expect pain to get a bit worse. Your hand may swell also. Ice your shoulder plenty, continuously if possible. Fill up a plastic bag with ice, then wrap it in a towel or pillow case.     2) ACTIVITY: Elevate your hand and wiggle your wingers often (10x per hour). NO LIFTING ANYTHING with the arm. Be up and walking as much as you can tolerate.     3) BANDAGE: Expect some mild bloody drainage. It is normal. It may soak through the gauze and ACE bandage. This is mostly leftover Saline fluid coming out from surgery.     4) SHOWER: Remove bandage in 72hrs and place a Waterproof Band Aide over each of the 3 incisions. You can shower in 48 hours. No bath. Pat your incisions dry. No creams, no lotions.    5) Only reason to worry would be if pain got so severe that you cannot feel or wiggle your fingers. In this case you need to call or come to the ER. But as long as you can feel and wiggle your fingers, you are fine.    6) Call the office to schedule a follow up appointment to see Dr. Granger in 10-14 days.     7) A pain Rx is in the chart; fill it on your way home. OR was sent electronically to your pharmacy, pick it up on the way home.

## 2019-05-03 NOTE — ASU DISCHARGE PLAN (ADULT/PEDIATRIC) - CARE PROVIDER_API CALL
Ashu Granger (DO)  Orthopaedic Surgery  [address] [place] [address]  Phone: [phone]  Fax: [phone]  Follow Up Time:

## 2019-06-19 ENCOUNTER — APPOINTMENT (OUTPATIENT)
Dept: RHEUMATOLOGY | Facility: CLINIC | Age: 49
End: 2019-06-19
Payer: MEDICAID

## 2019-06-19 VITALS
DIASTOLIC BLOOD PRESSURE: 78 MMHG | BODY MASS INDEX: 31.55 KG/M2 | RESPIRATION RATE: 17 BRPM | TEMPERATURE: 98.1 F | SYSTOLIC BLOOD PRESSURE: 136 MMHG | HEART RATE: 70 BPM | WEIGHT: 213 LBS | HEIGHT: 69 IN | OXYGEN SATURATION: 95 %

## 2019-06-19 PROCEDURE — 99214 OFFICE O/P EST MOD 30 MIN: CPT | Mod: 25

## 2019-06-19 PROCEDURE — 36415 COLL VENOUS BLD VENIPUNCTURE: CPT

## 2019-06-19 RX ORDER — DULOXETINE HYDROCHLORIDE 30 MG/1
30 CAPSULE, DELAYED RELEASE PELLETS ORAL
Qty: 30 | Refills: 0 | Status: COMPLETED | COMMUNITY
Start: 2019-02-27

## 2019-06-20 PROBLEM — I82.409 ACUTE EMBOLISM AND THROMBOSIS OF UNSPECIFIED DEEP VEINS OF UNSPECIFIED LOWER EXTREMITY: Chronic | Status: ACTIVE | Noted: 2019-04-25

## 2019-06-20 PROBLEM — F32.9 MAJOR DEPRESSIVE DISORDER, SINGLE EPISODE, UNSPECIFIED: Chronic | Status: ACTIVE | Noted: 2019-04-25

## 2019-06-20 PROBLEM — K21.9 GASTRO-ESOPHAGEAL REFLUX DISEASE WITHOUT ESOPHAGITIS: Chronic | Status: ACTIVE | Noted: 2019-04-25

## 2019-06-24 NOTE — ED STATDOCS - NS_ ATTENDINGSCRIBEDETAILS _ED_A_ED_FT
Central Prior Authorization Team   Phone: [phone]    PA Initiation    Medication: famotidine (PEPCID) 20 MG tablet  Insurance Company: [employer] [place] - Phone [phone] Fax [phone]  Pharmacy Filling the Rx: [id number] - [place], [place] - [address] [place] [address] & [facility]  Filling Pharmacy Phone: [phone]  Filling Pharmacy Fax:    Start Date: 6/24/2019         The scribe's documentation has been prepared under my direction and personally reviewed by me in its entirety. I confirm that the note above accurately reflects all work, treatment, procedures, and medical decision making performed by me.

## 2019-07-08 NOTE — HISTORY OF PRESENT ILLNESS
[___ Month(s) Ago] : [unfilled] month(s) ago [FreeTextEntry1] : - s/p L shoulder surgery\par - duloxetine not overly helpful, currently now on 60mg/d with better improvements\par - pain in neck and lower back remains unchanged, B/L knees, and B/L shoulders\par - denies swelling of joints\par - lower back and SI pain increased recently, pain centering to B/L lower midline buttocks. [de-identified] : \par \par All other ROS negative except as mentioned in HPI.

## 2019-07-08 NOTE — ASSESSMENT
Armando Thomas is a 62 y.o.  female and presents with     Chief Complaint   Patient presents with    Sinus Infection    Thoracic Back Pain    Back Pain    Constipation       Pt does have dysuria  And urinary incont. Pt has upper and lower back pain and has  Left and rt rib cage pain. Pt has constipation. Pt does have sinus infection. Past Medical History:   Diagnosis Date    Arthritis     Bipolar 1 disorder ([address]     Carpal tunnel syndrome     bother arms    Fibromyalgia     GERD (gastroesophageal reflux disease)     Hypercholesterolemia     IBS (irritable bowel syndrome)     Sleep apnea     Uses CPAP    Slipped cervical disc      Past Surgical History:   Procedure Laterality Date    HX CYST REMOVAL      Left wrist    HX HYSTERECTOMY      uterus prolapse    HX ORTHOPAEDIC      Carpal tunnel    HX OTHER SURGICAL      cyst removal wrist and vagina    HX TUBAL LIGATION       Current Outpatient Prescriptions   Medication Sig    amoxicillin (AMOXIL) 500 mg capsule Take 1 Cap by mouth three (3) times daily for 10 days.  lactulose (CHRONULAC) 10 gram/15 mL solution Take 45 mL by mouth nightly.  lubiPROStone (AMITIZA) 8 mcg capsule Take 8 mcg by mouth two (2) times daily (with meals).  traMADol (ULTRAM) 50 mg tablet Take 1 Tab by mouth every six (6) hours as needed for Pain. Max Daily Amount: 200 mg.    omeprazole (PRILOSEC) 20 mg capsule Take 1 Cap by mouth two (2) times a day.  metFORMIN (GLUCOPHAGE) 500 mg tablet Take 1 Tab by mouth two (2) times daily (with meals). No current facility-administered medications for this visit.       Health Maintenance   Topic Date Due    INFLUENZA AGE 9 TO ADULT  08/01/2017    COLONOSCOPY  09/03/2018    BREAST CANCER SCRN MAMMOGRAM  07/03/2019    PAP AKA CERVICAL CYTOLOGY  10/28/2019    DTaP/Tdap/Td series (2 - Td) 10/28/2026    Hepatitis C Screening  Completed     Immunization History   Administered Date(s) Administered    Influenza [FreeTextEntry1] : 48y M with chronic arthralgias affecting multiple joints that appear degenerative due to OA. He has h/o hemochromatosis and undergoes monthly phlebotomy. He also has a h/o DVT on A/C w/ Eliquis. He takes Naproxen while on A/C with little results. He would benefit from alternate pain medication for his pain.\par I will check serologies for any underlying systemic inflammatory autoimmune rheumatic disease such as Sjögren's due to his dry eyes and mouth.\par Serologies negative for systemic inflammatory autoimmune rheumatic disease, however slight elevated inflamm markers, will recheck again in future\par \par - c/w duloxetine 60mg/d, R/B/A discussed\par - c/w Etodolac ER 500mg/d as needed for pain -do not take prior to surgery by at least 7 days. Reinforced and pt understands\par - XR shoulders. pelvis/hip, SI joints\par \par RTO 2 months Vaccine (Quad) PF 10/28/2016    Tdap 10/28/2016     No LMP recorded. Patient has had a hysterectomy. Allergies and Intolerances: Allergies   Allergen Reactions    Biaxin [Clarithromycin] Rash    Codeine Itching    Morphine Shortness of Breath and Itching     burning    Nubain [Nalbuphine] Itching       Family History:   Family History   Problem Relation Age of Onset    Alcohol abuse Father        Social History:   She  reports that she has never smoked. She has never used smokeless tobacco.  She  reports that she does not drink alcohol.             Review of Systems:   General: negative for - chills, fatigue, fever, weight change  Psych: negative for - anxiety, depression, irritability or mood swings  ENT: negative for - headaches, hearing change, nasal congestion, oral lesions, sneezing or sore throat  Heme/ Lymph: negative for - bleeding problems, bruising, pallor or swollen lymph nodes  Endo: negative for - hot flashes, polydipsia/polyuria or temperature intolerance  Resp: negative for - cough, shortness of breath or wheezing  CV: negative for - chest pain, edema or palpitations  GI: negative for - abdominal pain, change in bowel habits, constipation, diarrhea or nausea/vomiting  : negative for -pos for   dysuria,  Neg for hematuria, incontinence, pelvic pain or vulvar/vaginal symptoms,     pos for upper and lower back pain  MSK: negative for - joint pain, joint swelling or muscle pain  Neuro: negative for - confusion, headaches, seizures or weakness  Derm: negative for - dry skin, hair changes, rash or skin lesion changes          Physical:   Vitals:   Vitals:    07/26/17 1000   BP: 133/79   Pulse: 73   Resp: 18   Temp: 97.8 °F (36.6 °C)   TempSrc: Oral   SpO2: 98%   Weight: 243 lb 12.8 oz (110.6 kg)   Height: 5' 8\" (1.727 m)           Exam:   HEENT- atraumatic,normocephalic, awake, oriented, well nourished  Neck - supple,no enlarged lymph nodes, no JVD, no thyromegaly  Chest- CTA, no rhonchi, no crackles  Heart- rrr, no murmurs / gallop/rub  Abdomen- soft,BS+,NT, no hepatosplenomegaly  Ext - no c/c/edema   Neuro- no focal deficits. Power 5/5 all extremities  Skin - warm,dry, no obvious rashes. Back - upper and lower back tenderness ++        Review of Data:   LABS:   Lab Results   Component Value Date/Time    WBC 8.9 12/06/2016 12:00 AM    HGB 12.1 12/06/2016 12:00 AM    HCT 35.9 12/06/2016 12:00 AM    PLATELET 006 94/32/4368 12:00 AM     Lab Results   Component Value Date/Time    Sodium 142 12/06/2016 12:00 AM    Potassium 4.2 12/06/2016 12:00 AM    Chloride 101 12/06/2016 12:00 AM    CO2 24 12/06/2016 12:00 AM    Glucose 106 12/06/2016 12:00 AM    BUN 12 12/06/2016 12:00 AM    Creatinine 0.93 12/06/2016 12:00 AM     Lab Results   Component Value Date/Time    Cholesterol, total 203 10/28/2016 11:42 AM    HDL Cholesterol 52 10/28/2016 11:42 AM    LDL, calculated 123 10/28/2016 11:42 AM    Triglyceride 140 10/28/2016 11:42 AM     No results found for: GPT        Impression / Plan:        ICD-10-CM ICD-9-CM    1. Upper back pain M54.9 724.5 XR SPINE THORAC 2 V   2. Frequent urination R35.0 788.41 AMB POC URINALYSIS DIP STICK AUTO W/O MICRO      CULTURE, URINE   3. Chronic midline low back pain without sciatica M54.5 724.2 XR SPINE LUMB 2 OR 3 V    G89.29 338.29    4. Dysuria R30.0 788. 1 CULTURE, URINE   5. Constipation, unspecified constipation type K59.00 564.00 lactulose (CHRONULAC) 10 gram/15 mL solution   6. Subacute frontal sinusitis J01.10 461.1 amoxicillin (AMOXIL) 500 mg capsule       Explained to patient risk benefits of the medications. Advised patient to stop meds if having any side effects. Pt verbalized understanding of the instructions. I have discussed the diagnosis with the patient and the intended plan as seen in the above orders. The patient has received an after-visit summary and questions were answered concerning future plans.   I have discussed medication side effects and warnings with the patient as well. I have reviewed the plan of care with the patient, accepted their input and they are in agreement with the treatment goals. Reviewed plan of care. Patient has provided input and agrees with goals.     Follow-up Disposition: Not on Yolis Feliz MD

## 2019-07-08 NOTE — PHYSICAL EXAM
[General Appearance - Alert] : alert [General Appearance - In No Acute Distress] : in no acute distress [General Appearance - Well Nourished] : well nourished [General Appearance - Well Developed] : well developed [General Appearance - Well-Appearing] : healthy appearing [Sclera] : the sclera and conjunctiva were normal [PERRL With Normal Accommodation] : pupils were equal in size, round, and reactive to light [Extraocular Movements] : extraocular movements were intact [Outer Ear] : the ears and nose were normal in appearance [Hearing Threshold Finger Rub Not [place]] : hearing was normal [Examination Of The Oral Cavity] : the lips and gums were normal [Nasal Cavity] : the nasal mucosa and septum were normal [Oropharynx] : the oropharynx was normal [Neck Appearance] : the appearance of the neck was normal [Neck Cervical Mass (___cm)] : no neck mass was observed [Jugular Venous Distention Increased] : there was no jugular-venous distention [Thyroid Diffuse Enlargement] : the thyroid was not enlarged [Thyroid Nodule] : there were no palpable thyroid nodules [Respiration, Rhythm And Depth] : normal respiratory rhythm and effort [Auscultation Breath Sounds / Voice Sounds] : lungs were clear to auscultation bilaterally [Heart Rate And Rhythm] : heart rate was normal and rhythm regular [Heart Sounds] : normal S1 and S2 [Heart Sounds Gallop] : no gallops [Murmurs] : no murmurs [Heart Sounds Pericardial Friction Rub] : no pericardial rub [Full Pulse] : the pedal pulses are present [Edema] : there was no peripheral edema [Bowel Sounds] : normal bowel sounds [Abdomen Soft] : soft [Abdomen Tenderness] : non-tender [Abdomen Mass (___ Cm)] : no abdominal mass palpated [Cervical Lymph Nodes Enlarged Posterior Bilaterally] : posterior cervical [Cervical Lymph Nodes Enlarged Anterior Bilaterally] : anterior cervical [Supraclavicular Lymph Nodes Enlarged Bilaterally] : supraclavicular [Axillary Lymph Nodes Enlarged Bilaterally] : axillary [No CVA Tenderness] : no ~M costovertebral angle tenderness [No Spinal Tenderness] : no spinal tenderness [Abnormal Walk] : normal gait [Nail Clubbing] : no clubbing  or cyanosis of the fingernails [Musculoskeletal - Swelling] : no joint swelling seen [Motor Tone] : muscle strength and tone were normal [Skin Color & Pigmentation] : normal skin color and pigmentation [Skin Turgor] : normal skin turgor [] : no rash [FreeTextEntry1] : Chadtoos [Deep Tendon Reflexes (DTR)] : deep tendon reflexes were 2+ and symmetric [Sensation] : the sensory exam was normal to light touch and pinprick [Motor Exam] : the motor exam was normal [No Focal Deficits] : no focal deficits [Oriented To Time, Place, And Person] : oriented to person, place, and time [Impaired Insight] : insight and judgment were intact [Affect] : the affect was normal [Mood] : the mood was normal

## 2019-07-08 NOTE — CONSULT LETTER
[Dear  ___] : Dear  [unfilled], [Consult Letter:] : I had the pleasure of evaluating your patient, [unfilled]. [Please see my note below.] : Please see my note below. [Consult Closing:] : Thank you very much for allowing me to participate in the care of this patient.  If you have any questions, please do not hesitate to contact me. [Sincerely,] : Sincerely, [FreeTextEntry3] : Willi Arndt II, MD\par \par Attending Rheumatologist\par Division of Rheumatology\par [facility]\par     [facility] &\par     Rheumatology at [place],\par     [facility]\par \par \par [facility]\par \par Contact:\par    [phone], fax [phone] ([place] office)\par    [phone], fax [phone] ([place] office)\par

## 2019-07-24 ENCOUNTER — APPOINTMENT (OUTPATIENT)
Dept: RHEUMATOLOGY | Facility: CLINIC | Age: 49
End: 2019-07-24

## 2019-09-18 ENCOUNTER — APPOINTMENT (OUTPATIENT)
Dept: RHEUMATOLOGY | Facility: CLINIC | Age: 49
End: 2019-09-18
Payer: MEDICAID

## 2019-09-18 VITALS
HEART RATE: 99 BPM | WEIGHT: 215 LBS | SYSTOLIC BLOOD PRESSURE: 126 MMHG | HEIGHT: 69 IN | OXYGEN SATURATION: 98 % | RESPIRATION RATE: 17 BRPM | TEMPERATURE: 98.7 F | DIASTOLIC BLOOD PRESSURE: 84 MMHG | BODY MASS INDEX: 31.84 KG/M2

## 2019-09-18 PROCEDURE — 99214 OFFICE O/P EST MOD 30 MIN: CPT

## 2019-09-18 RX ORDER — ETODOLAC 500 MG/1
500 TABLET, FILM COATED ORAL
Qty: 30 | Refills: 3 | Status: DISCONTINUED | COMMUNITY
Start: 2019-04-29 | End: 2019-09-18

## 2019-09-18 RX ORDER — ETODOLAC 500 MG/1
500 TABLET, FILM COATED, EXTENDED RELEASE ORAL
Qty: 30 | Refills: 1 | Status: DISCONTINUED | COMMUNITY
Start: 2019-04-24 | End: 2019-09-18

## 2019-10-15 ENCOUNTER — APPOINTMENT (OUTPATIENT)
Dept: GASTROENTEROLOGY | Facility: CLINIC | Age: 49
End: 2019-10-15
Payer: MEDICAID

## 2019-10-15 VITALS
SYSTOLIC BLOOD PRESSURE: 146 MMHG | HEART RATE: 62 BPM | WEIGHT: 215 LBS | RESPIRATION RATE: 16 BRPM | BODY MASS INDEX: 31.84 KG/M2 | OXYGEN SATURATION: 99 % | HEIGHT: 69 IN | DIASTOLIC BLOOD PRESSURE: 83 MMHG

## 2019-10-15 PROCEDURE — 99204 OFFICE O/P NEW MOD 45 MIN: CPT

## 2019-10-15 NOTE — PHYSICAL EXAM
[General Appearance - Alert] : alert [General Appearance - In No Acute Distress] : in no acute distress [Sclera] : the sclera and conjunctiva were normal [PERRL With Normal Accommodation] : pupils were equal in size, round, and reactive to light [Extraocular Movements] : extraocular movements were intact [Outer Ear] : the ears and nose were normal in appearance [Neck Appearance] : the appearance of the neck was normal [Oropharynx] : the oropharynx was normal [Neck Cervical Mass (___cm)] : no neck mass was observed [Jugular Venous Distention Increased] : there was no jugular-venous distention [Thyroid Nodule] : there were no palpable thyroid nodules [Thyroid Diffuse Enlargement] : the thyroid was not enlarged [Auscultation Breath Sounds / Voice Sounds] : lungs were clear to auscultation bilaterally [Heart Rate And Rhythm] : heart rate was normal and rhythm regular [Heart Sounds] : normal S1 and S2 [Heart Sounds Gallop] : no gallops [Murmurs] : no murmurs [Heart Sounds Pericardial Friction Rub] : no pericardial rub [Edema] : there was no peripheral edema [Bowel Sounds] : normal bowel sounds [Abdomen Soft] : soft [Abdomen Tenderness] : non-tender [] : no hepato-splenomegaly [Abdomen Mass (___ Cm)] : no abdominal mass palpated [Cervical Lymph Nodes Enlarged Posterior Bilaterally] : posterior cervical [Cervical Lymph Nodes Enlarged Anterior Bilaterally] : anterior cervical [Supraclavicular Lymph Nodes Enlarged Bilaterally] : supraclavicular [Nail Clubbing] : no clubbing  or cyanosis of the fingernails [Skin Color & Pigmentation] : normal skin color and pigmentation [Skin Turgor] : normal skin turgor [No Focal Deficits] : no focal deficits [Oriented To Time, Place, And Person] : oriented to person, place, and time [Impaired Insight] : insight and judgment were intact [Affect] : the affect was normal

## 2019-10-15 NOTE — CONSULT LETTER
[Dear  ___] : Dear  [unfilled], [( Thank you for referring [unfilled] for consultation for _____ )] : Thank you for referring [unfilled] for consultation for [unfilled] [Please see my note below.] : Please see my note below. [Consult Closing:] : Thank you very much for allowing me to participate in the care of this patient.  If you have any questions, please do not hesitate to contact me. [FreeTextEntry3] : Very truly yours,\par \par JUDI Corey MD\par [facility]\par Gastroenterology at [place]\par [address], [address]\[place] [address]\par Tel [phone]\par Fax [phone]

## 2019-10-15 NOTE — ASSESSMENT
[FreeTextEntry1] : The patient is of average risk for colorectal cancer.  Will schedule routine screening colonoscopy.  Moviprep ordered. \par \par The description of the colonoscopy procedure was discussed in depth as were the alternatives and risks, the alternatives including a barium enema, a CT scan, a CT colonography, or stool testing (hemoccult/FIT).  It was explained that although these alternatives may be useful and may give general information about problems within the colon, but they do not provide the in-depth information, direct visibility and the ability to resect polyps as a colonoscopy does.  The risks were thoroughly described and may include but are not limited to: bleeding (immediate or up to 14 days after the procedure), missed polyps and/or cancer that may not be seen during the procedure, rectal irritation, medication reaction (to sedation, or any other medications administered), irritation of the vein used for IV medication, infection, tear or perforation of the colon and rectum, abdominal bloating and cramping.  Additionally discussed were rare complications that may require additional treatment such as surgery, hospitalization, repeat endoscopy and/or blood transfusion.  Lastly, mentioned is a small risk of cardiopulmonary events such as loss of breathing and heart rhythm disturbances including rare cardiopulmonary arrest.

## 2019-10-15 NOTE — HISTORY OF PRESENT ILLNESS
[de-identified] : The patient was referred by PCP for evaluation for a screening colonoscopy.  There is no colonoscopy history.  The patient denies any changes in bowel habits, abdominal pain, rectal bleeding, nausea, vomiting, diarrhea, constipation or a family history of colorectal cancer.  The patient reports feeling well and has no complaints.

## 2019-10-15 NOTE — REASON FOR VISIT
[Initial Evaluation] : an initial evaluation [[employer] ] : provided by [employer]   [FreeTextEntry1] : [id number] [FreeTextEntry2] : [place] [TWNoteComboBox1] : [place]

## 2019-10-18 ENCOUNTER — OTHER (OUTPATIENT)
Age: 49
End: 2019-10-18

## 2019-11-20 ENCOUNTER — APPOINTMENT (OUTPATIENT)
Dept: RHEUMATOLOGY | Facility: CLINIC | Age: 49
End: 2019-11-20
Payer: MEDICAID

## 2019-11-20 VITALS
HEART RATE: 82 BPM | SYSTOLIC BLOOD PRESSURE: 126 MMHG | DIASTOLIC BLOOD PRESSURE: 82 MMHG | BODY MASS INDEX: 31.84 KG/M2 | TEMPERATURE: 98.2 F | HEIGHT: 69 IN | OXYGEN SATURATION: 98 % | RESPIRATION RATE: 17 BRPM | WEIGHT: 215 LBS

## 2019-11-20 PROCEDURE — 36415 COLL VENOUS BLD VENIPUNCTURE: CPT

## 2019-11-20 PROCEDURE — 99214 OFFICE O/P EST MOD 30 MIN: CPT | Mod: 25

## 2019-12-06 LAB
14-3-3 ETA AG SER IA-MCNC: <0.2 NG/ML
ALBUMIN SERPL ELPH-MCNC: 4.1 G/DL
ALBUMIN SERPL ELPH-MCNC: 4.3 G/DL
ALP BLD-CCNC: 103 U/L
ALP BLD-CCNC: 72 U/L
ALT SERPL-CCNC: 24 U/L
ALT SERPL-CCNC: 32 U/L
ANION GAP SERPL CALC-SCNC: 13 MMOL/L
ANION GAP SERPL CALC-SCNC: 13 MMOL/L
AST SERPL-CCNC: 16 U/L
AST SERPL-CCNC: 23 U/L
BASOPHILS # BLD AUTO: 0.09 K/UL
BASOPHILS NFR BLD AUTO: 0.7 %
BILIRUB SERPL-MCNC: 0.2 MG/DL
BILIRUB SERPL-MCNC: 0.3 MG/DL
BUN SERPL-MCNC: 20 MG/DL
BUN SERPL-MCNC: 23 MG/DL
CALCIUM SERPL-MCNC: 9.4 MG/DL
CALCIUM SERPL-MCNC: 9.7 MG/DL
CHLORIDE SERPL-SCNC: 100 MMOL/L
CHLORIDE SERPL-SCNC: 103 MMOL/L
CO2 SERPL-SCNC: 24 MMOL/L
CO2 SERPL-SCNC: 25 MMOL/L
CREAT SERPL-MCNC: 1.3 MG/DL
CREAT SERPL-MCNC: 1.31 MG/DL
CRP SERPL-MCNC: 0.1 MG/DL
EOSINOPHIL # BLD AUTO: 0.08 K/UL
EOSINOPHIL NFR BLD AUTO: 0.7 %
ERYTHROCYTE [SEDIMENTATION RATE] IN BLOOD BY WESTERGREN METHOD: 9 MM/HR
GLUCOSE SERPL-MCNC: 180 MG/DL
GLUCOSE SERPL-MCNC: 204 MG/DL
HCT VFR BLD CALC: 47.9 %
HGB BLD-MCNC: 15 G/DL
IMM GRANULOCYTES NFR BLD AUTO: 1 %
LYMPHOCYTES # BLD AUTO: 2.49 K/UL
LYMPHOCYTES NFR BLD AUTO: 20.3 %
MAN DIFF?: NORMAL
MCHC RBC-ENTMCNC: 28.8 PG
MCHC RBC-ENTMCNC: 31.3 GM/DL
MCV RBC AUTO: 92.1 FL
MONOCYTES # BLD AUTO: 0.75 K/UL
MONOCYTES NFR BLD AUTO: 6.1 %
NEUTROPHILS # BLD AUTO: 8.71 K/UL
NEUTROPHILS NFR BLD AUTO: 71.2 %
PLATELET # BLD AUTO: 273 K/UL
POTASSIUM SERPL-SCNC: 4.4 MMOL/L
POTASSIUM SERPL-SCNC: 4.8 MMOL/L
PROT SERPL-MCNC: 6.8 G/DL
PROT SERPL-MCNC: 6.8 G/DL
RBC # BLD: 5.2 M/UL
RBC # FLD: 14 %
SODIUM SERPL-SCNC: 138 MMOL/L
SODIUM SERPL-SCNC: 140 MMOL/L
WBC # FLD AUTO: 12.24 K/UL

## 2020-01-06 ENCOUNTER — APPOINTMENT (OUTPATIENT)
Dept: GASTROENTEROLOGY | Facility: GI CENTER | Age: 50
End: 2020-01-06
Payer: MEDICAID

## 2020-01-06 ENCOUNTER — OUTPATIENT (OUTPATIENT)
Dept: OUTPATIENT SERVICES | Facility: HOSPITAL | Age: 50
LOS: 1 days | End: 2020-01-06
Payer: MEDICAID

## 2020-01-06 DIAGNOSIS — Z12.11 ENCOUNTER FOR SCREENING FOR MALIGNANT NEOPLASM OF COLON: ICD-10-CM

## 2020-01-06 DIAGNOSIS — K40.20 BILATERAL INGUINAL HERNIA, WITHOUT OBSTRUCTION OR GANGRENE, NOT SPECIFIED AS RECURRENT: Chronic | ICD-10-CM

## 2020-01-06 DIAGNOSIS — K64.4 RESIDUAL HEMORRHOIDAL SKIN TAGS: ICD-10-CM

## 2020-01-06 DIAGNOSIS — K57.30 DIVERTICULOSIS OF LARGE INTESTINE W/OUT PERFORATION OR ABSCESS W/OUT BLEEDING: ICD-10-CM

## 2020-01-06 DIAGNOSIS — K59.00 CONSTIPATION, UNSPECIFIED: ICD-10-CM

## 2020-01-06 DIAGNOSIS — R10.9 UNSPECIFIED ABDOMINAL PAIN: ICD-10-CM

## 2020-01-06 DIAGNOSIS — K64.9 UNSPECIFIED HEMORRHOIDS: ICD-10-CM

## 2020-01-06 DIAGNOSIS — K92.1 MELENA: ICD-10-CM

## 2020-01-06 DIAGNOSIS — Z98.890 OTHER SPECIFIED POSTPROCEDURAL STATES: Chronic | ICD-10-CM

## 2020-01-06 PROCEDURE — G0121: CPT

## 2020-01-06 PROCEDURE — 45378 DIAGNOSTIC COLONOSCOPY: CPT

## 2020-01-06 RX ORDER — POLYETHYLENE GLYCOL 3350, SODIUM SULFATE, SODIUM CHLORIDE, POTASSIUM CHLORIDE, ASCORBIC ACID, SODIUM ASCORBATE 7.5-2.691G
100 KIT ORAL
Qty: 1 | Refills: 0 | Status: COMPLETED | COMMUNITY
Start: 2019-10-15 | End: 2020-01-06

## 2020-01-06 NOTE — PROCEDURE
[Colon Cancer Screening] : colon cancer screening [Hematochezia] : hematochezia [Procedure Explained] : The procedure was explained [Allergies Reviewed] : allergies reviewed. [Risks] : Risks [Benefits] : benefits [Bleeding] : bleeding risk [Alternatives] : alternatives [Infection] : risk of infection [Consent Obtained] : written consent was obtained prior to the procedure and is detailed in the patient's record [Patient] : the patient [Bowel Prep Kit] : the patient took the appropriate bowel preparation kit as directed [Approved Diet Followed] : the patient avoided solid foods and adhered to the approved diet list for 24 hours prior to the procedure [Automated Blood Pressure Cuff] : automated blood pressure cuff [Cardiac Monitor] : cardiac monitor [Pulse Oximeter] : pulse oximeter [2] : 2 [Sedation Clearance] : the patient was cleared for moderate sedation [Performed By: ___] : Performed by:  SOBIA [Left Lateral Decubitus] : The patient was positioned in the left lateral decubitus position [Cecum (Landmarks)] : and guided to the cecum which was identified by the anatomic landmarks of the appendiceal orifice and ileocecal valve [Insufflated] : insufflated [Retroflex View] : a retroflex view of the rectum was performed [Propofol ___ mg IV] : Propofol [unfilled] ~Umg intravenously [Prep Qualtiy: ___] : Prep Quality:  [unfilled] [Withdrawal Time: ___] : Withdrawal Time:  [unfilled] [Abnormal Rectum] : a normal rectum [External Hemorrhoids] : external hemorrhoids [Normal Prostate] : a normal prostate [No Difficulty] : without difficulty [Patient Rotated Into Alternating Positions] : the patient was not rotated [Single Pass Needed] : after a single pass [Normal] : Normal [Diverticulosis] : diverticulosis [Hemorrhoids] : hemorrhoids [Tolerated Well] : the patient tolerated the procedure well [Vital Signs Stable] : the vital signs were stable [No Complications] : There were no complications [FreeTextEntry2] : Olympus [id number]

## 2020-01-06 NOTE — PHYSICAL EXAM
[General Appearance - Alert] : alert [General Appearance - In No Acute Distress] : in no acute distress [PERRL With Normal Accommodation] : pupils were equal in size, round, and reactive to light [Sclera] : the sclera and conjunctiva were normal [Extraocular Movements] : extraocular movements were intact [Outer Ear] : the ears and nose were normal in appearance [Oropharynx] : the oropharynx was normal [Neck Appearance] : the appearance of the neck was normal [Neck Cervical Mass (___cm)] : no neck mass was observed [Jugular Venous Distention Increased] : there was no jugular-venous distention [Thyroid Nodule] : there were no palpable thyroid nodules [Thyroid Diffuse Enlargement] : the thyroid was not enlarged [Auscultation Breath Sounds / Voice Sounds] : lungs were clear to auscultation bilaterally [Heart Rate And Rhythm] : heart rate was normal and rhythm regular [Heart Sounds Gallop] : no gallops [Murmurs] : no murmurs [Heart Sounds] : normal S1 and S2 [Bowel Sounds] : normal bowel sounds [Heart Sounds Pericardial Friction Rub] : no pericardial rub [Edema] : there was no peripheral edema [Abdomen Soft] : soft [Abdomen Mass (___ Cm)] : no abdominal mass palpated [Abdomen Tenderness] : non-tender [] : no hepato-splenomegaly [Supraclavicular Lymph Nodes Enlarged Bilaterally] : supraclavicular [Cervical Lymph Nodes Enlarged Anterior Bilaterally] : anterior cervical [Cervical Lymph Nodes Enlarged Posterior Bilaterally] : posterior cervical [Skin Turgor] : normal skin turgor [Nail Clubbing] : no clubbing  or cyanosis of the fingernails [Skin Color & Pigmentation] : normal skin color and pigmentation [Oriented To Time, Place, And Person] : oriented to person, place, and time [Impaired Insight] : insight and judgment were intact [No Focal Deficits] : no focal deficits [Affect] : the affect was normal

## 2020-01-06 NOTE — PHYSICAL EXAM
[General Appearance - Alert] : alert [General Appearance - In No Acute Distress] : in no acute distress [Sclera] : the sclera and conjunctiva were normal [PERRL With Normal Accommodation] : pupils were equal in size, round, and reactive to light [Extraocular Movements] : extraocular movements were intact [Oropharynx] : the oropharynx was normal [Neck Appearance] : the appearance of the neck was normal [Outer Ear] : the ears and nose were normal in appearance [Jugular Venous Distention Increased] : there was no jugular-venous distention [Neck Cervical Mass (___cm)] : no neck mass was observed [Thyroid Diffuse Enlargement] : the thyroid was not enlarged [Thyroid Nodule] : there were no palpable thyroid nodules [Auscultation Breath Sounds / Voice Sounds] : lungs were clear to auscultation bilaterally [Heart Sounds] : normal S1 and S2 [Heart Sounds Gallop] : no gallops [Heart Rate And Rhythm] : heart rate was normal and rhythm regular [Murmurs] : no murmurs [Edema] : there was no peripheral edema [Heart Sounds Pericardial Friction Rub] : no pericardial rub [Abdomen Soft] : soft [Bowel Sounds] : normal bowel sounds [Abdomen Tenderness] : non-tender [] : no hepato-splenomegaly [Abdomen Mass (___ Cm)] : no abdominal mass palpated [Cervical Lymph Nodes Enlarged Posterior Bilaterally] : posterior cervical [Supraclavicular Lymph Nodes Enlarged Bilaterally] : supraclavicular [Cervical Lymph Nodes Enlarged Anterior Bilaterally] : anterior cervical [Skin Turgor] : normal skin turgor [Skin Color & Pigmentation] : normal skin color and pigmentation [Nail Clubbing] : no clubbing  or cyanosis of the fingernails [Oriented To Time, Place, And Person] : oriented to person, place, and time [No Focal Deficits] : no focal deficits [Impaired Insight] : insight and judgment were intact [Affect] : the affect was normal

## 2020-01-06 NOTE — PROCEDURE
[Colon Cancer Screening] : colon cancer screening [Hematochezia] : hematochezia [Procedure Explained] : The procedure was explained [Allergies Reviewed] : allergies reviewed. [Risks] : Risks [Benefits] : benefits [Alternatives] : alternatives [Bleeding] : bleeding risk [Infection] : risk of infection [Patient] : the patient [Consent Obtained] : written consent was obtained prior to the procedure and is detailed in the patient's record [Bowel Prep Kit] : the patient took the appropriate bowel preparation kit as directed [Approved Diet Followed] : the patient avoided solid foods and adhered to the approved diet list for 24 hours prior to the procedure [Automated Blood Pressure Cuff] : automated blood pressure cuff [Cardiac Monitor] : cardiac monitor [Pulse Oximeter] : pulse oximeter [2] : 2 [Sedation Clearance] : the patient was cleared for moderate sedation [Performed By: ___] : Performed by:  SOBIA [Left Lateral Decubitus] : The patient was positioned in the left lateral decubitus position [Cecum (Landmarks)] : and guided to the cecum which was identified by the anatomic landmarks of the appendiceal orifice and ileocecal valve [Insufflated] : insufflated [Retroflex View] : a retroflex view of the rectum was performed [Propofol ___ mg IV] : Propofol [unfilled] ~Umg intravenously [Prep Qualtiy: ___] : Prep Quality:  [unfilled] [Withdrawal Time: ___] : Withdrawal Time:  [unfilled] [Abnormal Rectum] : a normal rectum [External Hemorrhoids] : external hemorrhoids [Normal Prostate] : a normal prostate [No Difficulty] : without difficulty [Patient Rotated Into Alternating Positions] : the patient was not rotated [Single Pass Needed] : after a single pass [Normal] : Normal [Hemorrhoids] : hemorrhoids [Diverticulosis] : diverticulosis [Tolerated Well] : the patient tolerated the procedure well [Vital Signs Stable] : the vital signs were stable [No Complications] : There were no complications [FreeTextEntry2] : [employer] [id number]

## 2020-03-04 ENCOUNTER — APPOINTMENT (OUTPATIENT)
Dept: RHEUMATOLOGY | Facility: CLINIC | Age: 50
End: 2020-03-04
Payer: MEDICAID

## 2020-03-04 VITALS
DIASTOLIC BLOOD PRESSURE: 80 MMHG | TEMPERATURE: 98.1 F | SYSTOLIC BLOOD PRESSURE: 122 MMHG | BODY MASS INDEX: 31.84 KG/M2 | HEIGHT: 69 IN | RESPIRATION RATE: 17 BRPM | WEIGHT: 215 LBS

## 2020-03-04 DIAGNOSIS — M47.816 SPONDYLOSIS W/OUT MYELOPATHY OR RADICULOPATHY, LUMBAR REGION: ICD-10-CM

## 2020-03-04 DIAGNOSIS — Z79.1 LONG TERM (CURRENT) USE OF NON-STEROIDAL ANTI-INFLAMMATORIES (NSAID): ICD-10-CM

## 2020-03-04 PROCEDURE — 99214 OFFICE O/P EST MOD 30 MIN: CPT

## 2020-03-04 RX ORDER — POLYETHYLENE GLYOCOL 3350, SODIUM CHLORIDE, SODIUM BICARBONATE AND POTASSIUM CHLORIDE 420; 11.2; 5.72; 1.48 G/4L; G/4L; G/4L; G/4L
420 POWDER, FOR SOLUTION NASOGASTRIC; ORAL
Qty: 1 | Refills: 0 | Status: DISCONTINUED | COMMUNITY
Start: 2019-10-18 | End: 2020-03-04

## 2020-03-04 RX ORDER — SERTRALINE HYDROCHLORIDE 100 MG/1
100 TABLET, FILM COATED ORAL
Refills: 0 | Status: DISCONTINUED | COMMUNITY
End: 2020-03-04

## 2020-03-04 RX ORDER — NAPROXEN 500 MG/1
500 TABLET ORAL
Refills: 0 | Status: DISCONTINUED | COMMUNITY
End: 2020-03-04

## 2020-04-01 ENCOUNTER — OUTPATIENT (OUTPATIENT)
Dept: OUTPATIENT SERVICES | Facility: HOSPITAL | Age: 50
LOS: 1 days | End: 2020-04-01
Payer: MEDICAID

## 2020-04-01 DIAGNOSIS — Z98.890 OTHER SPECIFIED POSTPROCEDURAL STATES: Chronic | ICD-10-CM

## 2020-04-01 DIAGNOSIS — K40.20 BILATERAL INGUINAL HERNIA, WITHOUT OBSTRUCTION OR GANGRENE, NOT SPECIFIED AS RECURRENT: Chronic | ICD-10-CM

## 2020-04-01 PROCEDURE — G9001: CPT

## 2020-04-11 ENCOUNTER — EMERGENCY (EMERGENCY)
Facility: HOSPITAL | Age: 50
LOS: 1 days | Discharge: HOME | End: 2020-04-11
Attending: EMERGENCY MEDICINE
Payer: COMMERCIAL

## 2020-04-11 VITALS
TEMPERATURE: 98 F | SYSTOLIC BLOOD PRESSURE: 158 MMHG | WEIGHT: 214.95 LBS | HEIGHT: 69 IN | RESPIRATION RATE: 20 BRPM | DIASTOLIC BLOOD PRESSURE: 95 MMHG | OXYGEN SATURATION: 98 % | HEART RATE: 58 BPM

## 2020-04-11 DIAGNOSIS — K40.20 BILATERAL INGUINAL HERNIA, WITHOUT OBSTRUCTION OR GANGRENE, NOT SPECIFIED AS RECURRENT: Chronic | ICD-10-CM

## 2020-04-11 DIAGNOSIS — Z98.890 OTHER SPECIFIED POSTPROCEDURAL STATES: Chronic | ICD-10-CM

## 2020-04-11 PROCEDURE — 99283 EMERGENCY DEPT VISIT LOW MDM: CPT

## 2020-04-11 PROCEDURE — T1013: CPT

## 2020-04-11 RX ORDER — LIDOCAINE 4 G/100G
1 CREAM TOPICAL ONCE
Refills: 0 | Status: COMPLETED | OUTPATIENT
Start: 2020-04-11 | End: 2020-04-11

## 2020-04-11 RX ORDER — METHOCARBAMOL 500 MG/1
1 TABLET, FILM COATED ORAL
Qty: 16 | Refills: 0
Start: 2020-04-11 | End: 2020-04-14

## 2020-04-11 RX ORDER — METHOCARBAMOL 500 MG/1
1500 TABLET, FILM COATED ORAL ONCE
Refills: 0 | Status: COMPLETED | OUTPATIENT
Start: 2020-04-11 | End: 2020-04-11

## 2020-04-11 RX ORDER — IBUPROFEN 200 MG
600 TABLET ORAL ONCE
Refills: 0 | Status: COMPLETED | OUTPATIENT
Start: 2020-04-11 | End: 2020-04-11

## 2020-04-11 RX ADMIN — Medication 600 MILLIGRAM(S): at 09:03

## 2020-04-11 RX ADMIN — METHOCARBAMOL 1500 MILLIGRAM(S): 500 TABLET, FILM COATED ORAL at 09:03

## 2020-04-11 RX ADMIN — LIDOCAINE 1 PATCH: 4 CREAM TOPICAL at 09:03

## 2020-04-11 NOTE — ED PROVIDER NOTE - NSFOLLOWUPINSTRUCTIONS_ED_ALL_ED_FT
-TAKE MEDICATION AS PRESCRIBED   -TAKE MOTRIN/ TYLENOL AS NEEDED FOR PAIN    Back Pain    Back pain is very common in adults. The cause of back pain is rarely dangerous and the pain often gets better over time. The cause of your back pain may not be known and may include strain of muscles or ligaments, degeneration of the spinal disks, or arthritis. Occasionally the pain may radiate down your leg(s). Over-the-counter medicines to reduce pain and inflammation are often the most helpful. Stretching and remaining active frequently helps the healing process.     SEEK IMMEDIATE MEDICAL CARE IF YOU HAVE ANY OF THE FOLLOWING SYMPTOMS: bowel or bladder control problems, unusual weakness or numbness in your arms or legs, nausea or vomiting, abdominal pain, fever, dizziness/lightheadedness.
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
## 2020-04-14 DIAGNOSIS — Z71.89 OTHER SPECIFIED COUNSELING: ICD-10-CM

## 2020-05-23 DIAGNOSIS — Z01.818 ENCOUNTER FOR OTHER PREPROCEDURAL EXAMINATION: ICD-10-CM

## 2020-05-27 ENCOUNTER — APPOINTMENT (OUTPATIENT)
Dept: DISASTER EMERGENCY | Facility: CLINIC | Age: 50
End: 2020-05-27

## 2020-05-28 LAB — SARS-COV-2 N GENE NPH QL NAA+PROBE: NOT DETECTED

## 2020-07-01 ENCOUNTER — APPOINTMENT (OUTPATIENT)
Dept: RHEUMATOLOGY | Facility: CLINIC | Age: 50
End: 2020-07-01
Payer: MEDICAID

## 2020-07-01 VITALS
TEMPERATURE: 98.7 F | OXYGEN SATURATION: 97 % | BODY MASS INDEX: 31.84 KG/M2 | RESPIRATION RATE: 17 BRPM | SYSTOLIC BLOOD PRESSURE: 120 MMHG | WEIGHT: 215 LBS | HEART RATE: 82 BPM | DIASTOLIC BLOOD PRESSURE: 82 MMHG | HEIGHT: 69 IN

## 2020-07-01 PROCEDURE — 99214 OFFICE O/P EST MOD 30 MIN: CPT

## 2020-10-07 ENCOUNTER — APPOINTMENT (OUTPATIENT)
Dept: RHEUMATOLOGY | Facility: CLINIC | Age: 50
End: 2020-10-07
Payer: MEDICAID

## 2020-10-07 VITALS
TEMPERATURE: 98.8 F | HEART RATE: 78 BPM | DIASTOLIC BLOOD PRESSURE: 70 MMHG | OXYGEN SATURATION: 97 % | SYSTOLIC BLOOD PRESSURE: 110 MMHG | HEIGHT: 69 IN | RESPIRATION RATE: 16 BRPM | WEIGHT: 215 LBS | BODY MASS INDEX: 31.84 KG/M2

## 2020-10-07 DIAGNOSIS — M50.20 OTHER CERVICAL DISC DISPLACEMENT, UNSPECIFIED CERVICAL REGION: ICD-10-CM

## 2020-10-07 DIAGNOSIS — M19.041 PRIMARY OSTEOARTHRITIS, RIGHT HAND: ICD-10-CM

## 2020-10-07 DIAGNOSIS — M25.50 PAIN IN UNSPECIFIED JOINT: ICD-10-CM

## 2020-10-07 DIAGNOSIS — M47.812 SPONDYLOSIS W/OUT MYELOPATHY OR RADICULOPATHY, CERVICAL REGION: ICD-10-CM

## 2020-10-07 DIAGNOSIS — M17.0 BILATERAL PRIMARY OSTEOARTHRITIS OF KNEE: ICD-10-CM

## 2020-10-07 DIAGNOSIS — E83.110 HEREDITARY HEMOCHROMATOSIS: ICD-10-CM

## 2020-10-07 DIAGNOSIS — Z98.890 OTHER SPECIFIED POSTPROCEDURAL STATES: ICD-10-CM

## 2020-10-07 DIAGNOSIS — M19.011 PRIMARY OSTEOARTHRITIS, RIGHT SHOULDER: ICD-10-CM

## 2020-10-07 DIAGNOSIS — M19.012 PRIMARY OSTEOARTHRITIS, RIGHT SHOULDER: ICD-10-CM

## 2020-10-07 DIAGNOSIS — Z98.1 ARTHRODESIS STATUS: ICD-10-CM

## 2020-10-07 DIAGNOSIS — M19.042 PRIMARY OSTEOARTHRITIS, RIGHT HAND: ICD-10-CM

## 2020-10-07 DIAGNOSIS — G89.4 CHRONIC PAIN SYNDROME: ICD-10-CM

## 2020-10-07 PROCEDURE — 99214 OFFICE O/P EST MOD 30 MIN: CPT

## 2020-10-07 RX ORDER — SERTRALINE HYDROCHLORIDE 100 MG/1
100 TABLET, FILM COATED ORAL
Refills: 0 | Status: ACTIVE | COMMUNITY

## 2020-10-07 RX ORDER — APIXABAN 5 MG/1
5 TABLET, FILM COATED ORAL
Qty: 60 | Refills: 5 | Status: ACTIVE | COMMUNITY

## 2020-10-07 RX ORDER — PREGABALIN 75 MG/1
75 CAPSULE ORAL
Qty: 60 | Refills: 3 | Status: ACTIVE | COMMUNITY
Start: 2020-10-07 | End: 1900-01-01

## 2020-10-07 RX ORDER — OXYCODONE 10 MG/1
10 TABLET ORAL
Qty: 6 | Refills: 0 | Status: ACTIVE | COMMUNITY

## 2020-10-07 RX ORDER — LATANOPROST/PF 0.005 %
0.01 DROPS OPHTHALMIC (EYE)
Qty: 25 | Refills: 0 | Status: DISCONTINUED | COMMUNITY
Start: 2019-02-13 | End: 2020-10-07

## 2020-10-07 RX ORDER — NABUMETONE 750 MG/1
750 TABLET, FILM COATED ORAL
Qty: 60 | Refills: 2 | Status: DISCONTINUED | COMMUNITY
Start: 2019-09-18 | End: 2020-10-07

## 2020-10-07 RX ORDER — TIZANIDINE 4 MG/1
4 TABLET ORAL
Refills: 0 | Status: DISCONTINUED | COMMUNITY
End: 2020-10-07

## 2020-10-07 RX ORDER — METHOCARBAMOL 750 MG/1
750 TABLET, FILM COATED ORAL
Refills: 0 | Status: ACTIVE | COMMUNITY

## 2020-10-07 RX ORDER — BUPROPION HYDROCHLORIDE 300 MG/1
300 TABLET, EXTENDED RELEASE ORAL
Refills: 0 | Status: ACTIVE | COMMUNITY

## 2020-10-07 RX ORDER — DULOXETINE HYDROCHLORIDE 60 MG/1
60 CAPSULE, DELAYED RELEASE PELLETS ORAL
Qty: 60 | Refills: 4 | Status: DISCONTINUED | COMMUNITY
Start: 2019-02-27 | End: 2020-10-07

## 2020-10-08 ENCOUNTER — EMERGENCY (EMERGENCY)
Facility: HOSPITAL | Age: 50
LOS: 1 days | Discharge: HOME | End: 2020-10-08
Attending: EMERGENCY MEDICINE
Payer: MEDICAID

## 2020-10-08 VITALS
WEIGHT: 205.03 LBS | HEIGHT: 69 IN | TEMPERATURE: 98 F | HEART RATE: 69 BPM | SYSTOLIC BLOOD PRESSURE: 165 MMHG | RESPIRATION RATE: 20 BRPM | DIASTOLIC BLOOD PRESSURE: 98 MMHG | OXYGEN SATURATION: 99 %

## 2020-10-08 VITALS
RESPIRATION RATE: 16 BRPM | HEART RATE: 77 BPM | SYSTOLIC BLOOD PRESSURE: 137 MMHG | DIASTOLIC BLOOD PRESSURE: 85 MMHG | OXYGEN SATURATION: 97 %

## 2020-10-08 DIAGNOSIS — Z98.890 OTHER SPECIFIED POSTPROCEDURAL STATES: Chronic | ICD-10-CM

## 2020-10-08 DIAGNOSIS — K40.20 BILATERAL INGUINAL HERNIA, WITHOUT OBSTRUCTION OR GANGRENE, NOT SPECIFIED AS RECURRENT: Chronic | ICD-10-CM

## 2020-10-08 LAB
ALBUMIN SERPL ELPH-MCNC: 4.2 G/DL — SIGNIFICANT CHANGE UP (ref 3.3–5.2)
ALP SERPL-CCNC: 97 U/L — SIGNIFICANT CHANGE UP (ref 40–120)
ALT FLD-CCNC: 33 U/L — SIGNIFICANT CHANGE UP
ANION GAP SERPL CALC-SCNC: 12 MMOL/L — SIGNIFICANT CHANGE UP (ref 5–17)
APTT BLD: 34.2 SEC — SIGNIFICANT CHANGE UP (ref 27.5–35.5)
AST SERPL-CCNC: 32 U/L — SIGNIFICANT CHANGE UP
BASOPHILS # BLD AUTO: 0.06 K/UL — SIGNIFICANT CHANGE UP (ref 0–0.2)
BASOPHILS NFR BLD AUTO: 0.9 % — SIGNIFICANT CHANGE UP (ref 0–2)
BILIRUB SERPL-MCNC: 0.2 MG/DL — LOW (ref 0.4–2)
BUN SERPL-MCNC: 15 MG/DL — SIGNIFICANT CHANGE UP (ref 8–20)
CALCIUM SERPL-MCNC: 9.3 MG/DL — SIGNIFICANT CHANGE UP (ref 8.6–10.2)
CHLORIDE SERPL-SCNC: 98 MMOL/L — SIGNIFICANT CHANGE UP (ref 98–107)
CO2 SERPL-SCNC: 25 MMOL/L — SIGNIFICANT CHANGE UP (ref 22–29)
CREAT SERPL-MCNC: 1.13 MG/DL — SIGNIFICANT CHANGE UP (ref 0.5–1.3)
EOSINOPHIL # BLD AUTO: 0.01 K/UL — SIGNIFICANT CHANGE UP (ref 0–0.5)
EOSINOPHIL NFR BLD AUTO: 0.2 % — SIGNIFICANT CHANGE UP (ref 0–6)
GLUCOSE SERPL-MCNC: 158 MG/DL — HIGH (ref 70–99)
HCT VFR BLD CALC: 46.2 % — SIGNIFICANT CHANGE UP (ref 39–50)
HGB BLD-MCNC: 14.6 G/DL — SIGNIFICANT CHANGE UP (ref 13–17)
IMM GRANULOCYTES NFR BLD AUTO: 0.5 % — SIGNIFICANT CHANGE UP (ref 0–1.5)
INR BLD: 1.31 RATIO — HIGH (ref 0.88–1.16)
LYMPHOCYTES # BLD AUTO: 0.97 K/UL — LOW (ref 1–3.3)
LYMPHOCYTES # BLD AUTO: 15.2 % — SIGNIFICANT CHANGE UP (ref 13–44)
MCHC RBC-ENTMCNC: 28.5 PG — SIGNIFICANT CHANGE UP (ref 27–34)
MCHC RBC-ENTMCNC: 31.6 GM/DL — LOW (ref 32–36)
MCV RBC AUTO: 90.1 FL — SIGNIFICANT CHANGE UP (ref 80–100)
MONOCYTES # BLD AUTO: 0.85 K/UL — SIGNIFICANT CHANGE UP (ref 0–0.9)
MONOCYTES NFR BLD AUTO: 13.3 % — SIGNIFICANT CHANGE UP (ref 2–14)
NEUTROPHILS # BLD AUTO: 4.45 K/UL — SIGNIFICANT CHANGE UP (ref 1.8–7.4)
NEUTROPHILS NFR BLD AUTO: 69.9 % — SIGNIFICANT CHANGE UP (ref 43–77)
PLATELET # BLD AUTO: 215 K/UL — SIGNIFICANT CHANGE UP (ref 150–400)
POTASSIUM SERPL-MCNC: 4.5 MMOL/L — SIGNIFICANT CHANGE UP (ref 3.5–5.3)
POTASSIUM SERPL-SCNC: 4.5 MMOL/L — SIGNIFICANT CHANGE UP (ref 3.5–5.3)
PROT SERPL-MCNC: 7.6 G/DL — SIGNIFICANT CHANGE UP (ref 6.6–8.7)
PROTHROM AB SERPL-ACNC: 15 SEC — HIGH (ref 10.6–13.6)
RBC # BLD: 5.13 M/UL — SIGNIFICANT CHANGE UP (ref 4.2–5.8)
RBC # FLD: 13.5 % — SIGNIFICANT CHANGE UP (ref 10.3–14.5)
SODIUM SERPL-SCNC: 135 MMOL/L — SIGNIFICANT CHANGE UP (ref 135–145)
WBC # BLD: 6.37 K/UL — SIGNIFICANT CHANGE UP (ref 3.8–10.5)
WBC # FLD AUTO: 6.37 K/UL — SIGNIFICANT CHANGE UP (ref 3.8–10.5)

## 2020-10-08 PROCEDURE — 71046 X-RAY EXAM CHEST 2 VIEWS: CPT

## 2020-10-08 PROCEDURE — 85610 PROTHROMBIN TIME: CPT

## 2020-10-08 PROCEDURE — 93971 EXTREMITY STUDY: CPT

## 2020-10-08 PROCEDURE — 85730 THROMBOPLASTIN TIME PARTIAL: CPT

## 2020-10-08 PROCEDURE — 85025 COMPLETE CBC W/AUTO DIFF WBC: CPT

## 2020-10-08 PROCEDURE — 83880 ASSAY OF NATRIURETIC PEPTIDE: CPT

## 2020-10-08 PROCEDURE — 80053 COMPREHEN METABOLIC PANEL: CPT

## 2020-10-08 PROCEDURE — 84484 ASSAY OF TROPONIN QUANT: CPT

## 2020-10-08 PROCEDURE — 99285 EMERGENCY DEPT VISIT HI MDM: CPT

## 2020-10-08 PROCEDURE — 93005 ELECTROCARDIOGRAM TRACING: CPT

## 2020-10-08 PROCEDURE — 71275 CT ANGIOGRAPHY CHEST: CPT

## 2020-10-08 PROCEDURE — 99284 EMERGENCY DEPT VISIT MOD MDM: CPT | Mod: 25

## 2020-10-08 PROCEDURE — 71275 CT ANGIOGRAPHY CHEST: CPT | Mod: 26

## 2020-10-08 PROCEDURE — 71046 X-RAY EXAM CHEST 2 VIEWS: CPT | Mod: 26

## 2020-10-08 PROCEDURE — 93971 EXTREMITY STUDY: CPT | Mod: 26,RT

## 2020-10-08 PROCEDURE — T1013: CPT

## 2020-10-08 PROCEDURE — 36415 COLL VENOUS BLD VENIPUNCTURE: CPT

## 2020-10-08 PROCEDURE — 93010 ELECTROCARDIOGRAM REPORT: CPT

## 2020-10-08 NOTE — ED STATDOCS - NS_ ATTENDINGSCRIBEDETAILS _ED_A_ED_FT
I, Rachid Reed, performed the initial face to face bedside interview with this patient regarding history of present illness, review of symptoms and relevant past medical, social and family history.  I completed an independent physical examination.  I was the provider who initially evaluated this patient.  The history, relevant review of systems, past medical and surgical history, medical decision making, and physical examination was documented by the scribe in my presence and I attest to the accuracy of the documentation. Follow-up on ordered tests (ie labs, radiologic studies) and re-evaluation of the patient's status has been communicated to the ACP.  Disposition of the patient will be based on test outcome and response to ED interventions.
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
## 2020-10-08 NOTE — ED STATDOCS - NSFOLLOWUPCLINICS_GEN_ALL_ED_FT
[facility]  Vascular Surgery  [address] [place] [address]  Phone: [phone]  Fax:   Follow Up Time:
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
## 2020-10-08 NOTE — ED STATDOCS - OBJECTIVE STATEMENT
: Caroyln  51 y/o M with PMHx of Depression DVT, lower extremity, GERD without esophagitis, Glaucoma, Hemochromatosis, Renal insufficiency, Torn rotator cuff  right shoulder presents to the ED c/o anxiety. Pt states that he took Oxycodone and Eliquis this morning and since then he feels agitated and "not well". Pt endorses that he felt palpitations after taking his medications. Pt also reports back pain when taking deep breath  Denies CP : Carolyn  49 y/o M with PMHx of Depression DVT, lower extremity, GERD without esophagitis, Glaucoma, Hemochromatosis, Renal insufficiency, Torn rotator cuff  right shoulder presents to the ED c/o anxiety. Pt states that he took Oxycodone and Eliquis this morning and since then he feels agitated and "not well". Pt endorses that he felt palpitations after taking his medications. Pt also reports leg pain and swelling for the past 4 days along with back pain when taking deep breath  Denies CP

## 2020-10-08 NOTE — ED STATDOCS - NSFOLLOWUPINSTRUCTIONS_ED_ALL_ED_FT
Educación para el paciente: Trombosis venosa profunda (coágulos de westley en las piernas) (Conceptos Básicos)  View in English  Redactado por los médicos y editores de [employer]  ¿Qué es la trombosis venosa profunda?  La trombosis venosa profunda es el término médico para los coágulos de westley que se key en las venas profundas de la pierna (figura 1). La trombosis venosa profunda o “TVP” puede ser peligrosa.    Si se forma un coágulo de westley dentro de un vaso sanguíneo, el coágulo puede obstruir el vaso e impedir que la westley vaya a donde debe ir. Cuando esto ocurre en val de las venas profundas de las piernas, la westley puede acumularse y causar dolor e inflamación.    Otro problema con los coágulos sanguíneos es que pueden desplazarse a otras partes del cuerpo y obstruir vasos sanguíneos en esos lugares. Los coágulos sanguíneos que se key en las piernas, por ejemplo, pueden terminar bloqueando los vasos sanguíneos de los pulmones. [place] puede causar dificultades para respirar y, a veces, cuando los coágulos son grandes, puede provocar la muerte. Cuando los coágulos se desplazan hacia los pulmones, los médicos lo llaman “embolia pulmonar”.    ¿Cuáles son los síntomas de la TVP?  La TVP puede producir los siguientes síntomas en la pierna afectada:    ?Inflamación    ?Dolor    ?Sensación de calor y enrojecimiento    A veces se key coágulos en las venas que están más cerca de la superficie de la piel, llamadas "venas superficiales". Estos coágulos de westley causan un conjunto de síntomas diferentes. Los coágulos de westley en las venas cercanas a la superficie de la piel son más dolorosos y causan enrojecimiento o infección. En ocasiones, estos coágulos también pueden hacer que las venas se endurezcan, se hinchen y formen crestas que lucen joseline cordones. [place] es más común en las venas que están debajo de las rodillas.    Si tanya que tiene un coágulo en la pierna, llame de inmediato a braswell médico o enfermero. Los coágulos de westley en venas cercanas a la superficie de la piel son menos peligrosos, tessie los coágulos en las venas profundas de las piernas son más graves. Braswell médico o enfermero puede llevar a cabo pruebas para descubrir si tiene un coágulo que debe recibir tratamiento.    ¿Cuáles son los síntomas de los coágulos de westley en los pulmones?  Los coágulos de westley en los pulmones pueden causar:    ?Jadeo, falta de aire o dificultad para respirar    ?Dolor chadwick, joseline val puñalada, en el pecho al inhalar o esforzarse    ?Tos o tos con westley    ?Frecuencia cardíaca rápida    Si tiene alguno de estos síntomas, en particular si aparecen en poco tiempo (horas o días) o empeoran rápidamente, pida val ambulancia (en [facility]. y [place], [facility] 9-1-1). En el hospital, los médicos pueden llevar a cabo pruebas para saber si tiene un coágulo. Los coágulos de westley en los pulmones pueden causar la muerte. Por eso, es importante actuar rápidamente y averiguar si hay un coágulo.    ¿Cómo se trata la TVP?  La TVP se trata con medicinas que impiden que el coágulo se agrande y se traslade a los pulmones. Esas medicinas se llaman "anticoagulantes". Algunos vienen en forma de inyección y otros en forma de píldora. La TVP suele tratarse enrique en el hospital.    Si tuvo un coágulo, braswell médico le recetará val de esas medicinas para reducir el riesgo de que vuelva a tener coágulos más adelante. Deberá katie la medicina wilber 3 meses joseline mínimo (y a veces wilber más tiempo). Algunas personas usan enrique val medicina que viene en forma de inyección, llamada heparina. [place] puede ser wilber algunos días, o wilber más tiempo si por algún motivo no puede katie píldoras.    Las medicinas no disuelven los coágulos de westley existentes, tessie sí impiden que estos se agranden. También ayudan a prevenir la formación de nuevos coágulos. Katie la medicina wilber algunos meses es importante porque le da tiempo al cuerpo para que disuelva el coágulo anterior. También es importante porque las personas que tienen un coágulo corren el riesgo de desarrollar otro, especialmente en los primeros meses.    Existen distintas medicinas orales (píldoras) que se usan para prevenir y tratar los coágulos de westley. Entre ellos se incluyen el apixabán (mariah comercial: Eliquis), el dabigatrán (mariah comercial: Pradaxa), el edoxabán (marcas comerciales: Savaysa, Lixiana), el rivaroxabán (mariah comercial: Xarelto) y la warfarina (mariah comercial: Jantoven). Cada medicina difiere en cuanto a la dosis, la frecuencia de uso, el costo y cómo podría afectarla braswell alimentación u otras medicinas (tabla 1). Braswell médico puede hablarle sobre durga opciones y preferencias.    Si el médico le receta alguna de esas medicinas:    ?Úsela exactamente joseline lo indique el médico – Si olvida u omite val dosis, llame a braswell médico para que le diga qué hacer. Cuando empiece a usar la medicina, deberá realizarse val prueba de westley. Si rebeca warfarinatendrá que realizarse pruebas de westley periódicas para verificar cómo se está coagulando la westley. Eso es importante para garantizar que reciba la dosis de warfarina que es correcta para usted.    ?Siga las indicaciones del médico en cuanto a la alimentación y las medicinas – Según cuál sea la medicina que tome, es posible que tenga que prestar especial atención a lo que come. Además, ciertas medicinas distintas pueden afectar el modo en que actúan estas medicinas.    ?Esté atento a los signos de sangrado – El riesgo de sangrado anormal está presente con cualquiera de las medicinas que se usan para prevenir y tratar los coágulos de westley. Eso se debe a que, si glynn estas medicinas ayudan a prevenir los coágulos de westley peligrosos, también hacen que al cuerpo le resulte más difícil controlar el sangrado después de val lesión. Por eso, es importante que trate de evitar las lesiones, y que informe a braswell médico de inmediato si presenta signos de sangrado.    Las personas que no pueden katie medicinas para prevenir y tratar los coágulos o que no mejoran lo suficiente con las medicinas pueden recibir un tratamiento distinto. Carmen tratamiento se llama "filtro de la vena cava inferior". La vena cava inferior es la vena mayor que lleva westley desde las piernas y la mitad inferior del cuerpo nuevamente hasta el corazón. Los filtros de la vena cava inferior van dentro de la vena cava inferior, y filtran y atrapan los coágulos de gran tamaño que se key debajo del lugar donde está el filtro. Braswell médico podría sugerir tunde de estos filtros para usted si:    ?No puede usar val medicina para los coágulos porque no es seguro en braswell etienne    ?Desarrolla coágulos incluso mientras usa val medicina para los coágulos    ?Tiene un problema de sangrado peligroso mientras usa val medicina para los coágulos    ?[place] estado es tan grave que si un coágulo se traslada de las piernas a los pulmones podría morir    En algunos casos, los coágulos son tan graves que causan gangrena (val interrupción de la irrigación sanguínea en la pierna). Si eso sucede, los médicos pueden darle val medicina para disolver el coágulo. [place] medicina a veces recibe el nombre de "disolvente de coágulos" y se da mediante un catéter (un pequeño tubo que se introduce en val vena). En algunos casos, los médicos realizan val cirugía para quitar el coágulo.    ¿Hay algo que pueda hacer por mi cuenta para prevenir los coágulos de westley?  Sí. A veces, los coágulos se key porque la persona pasa mucho tiempo sentada sin moverse. La gente que hace viajes largos en avión, por ejemplo, tiene un riesgo mayor de tener coágulos de westley. Estas son algunas medidas que puede katie para ayudar a prevenir un coágulo wilber un vuelo ciro:    ?Pararse y caminar cada hora o cada dos horas    ?No fumar ebony antes del viaje    ?Usar ropa cómoda y suelta    ?Cambiar de posición mientras esté sentado, y  las piernas y los pies a menudo    ?Usar medias compresivas hasta la rodilla    ?Evitar el alcohol y las medicinas que dan sueño, porque pueden afectar braswell capacidad para andar

## 2020-10-08 NOTE — ED STATDOCS - PMH
Depression    DVT, lower extremity    GERD without esophagitis    Glaucoma    Hemochromatosis    Renal insufficiency    Torn rotator cuff  right shoulder

## 2020-10-08 NOTE — ED STATDOCS - CLINICAL SUMMARY MEDICAL DECISION MAKING FREE TEXT BOX
Nonspecific anxious feeing, palpitations and pleuritic back pain with history of DVT. Will do labs, imaging including doppler and X-ray.

## 2020-10-08 NOTE — ED STATDOCS - MUSCULOSKELETAL, MLM
range of motion is not limited and there is no muscle tenderness. No medline thoracic spine tenderness, no chest wall tenderness, no calf tenderness. No midline thoracic spine tenderness, no chest wall tenderness, no calf tenderness.

## 2020-10-08 NOTE — ED ADULT NURSE NOTE - OBJECTIVE STATEMENT
A&Ox4, RR even and unlabored. PT C/O palpitations and back pain that radiates down to his feet that began this morning  Reports feeling "strange and anxious" after taking oxycodone for pain at home.  Denies SOB, numbness or tingling.  Patient resting comfortably.

## 2020-10-08 NOTE — CONSULT NOTE ADULT - SUBJECTIVE AND OBJECTIVE BOX
Vascular Surgery Consult Note    HPI:   51 yo Male w/ PMH depression, GERD, b/l LE DVT complicated by PE presents to ED on 10-08-20 w/ complaint of chest/back pain, agitation, and anxiety after taking morning pills. Patient recently hospitalized at [facility] where he was found to have a PE 2/2 RLE DVT (reports and imaging from this visit unavailable) patient was treated medically and discharged home on eliquis 5 BID. At that time, patient noted he had RLE edema, pain, and warmth which has since resolved. Patient currently reporting no lower extremity symptoms, swelling or pain.     ED evaluation for chest pain included CXR, RLE duplex US, troponins, and CT angio chest. RLE US Duplex demonstrated R distal femoral to peroneal trunk DVT (no images for comparison from [facility]). CT angio chest was negative for PE. Vascular surgery consulted to evaluate for failure of AC treatment on current regimen.     PMH:  Depression    GERD without esophagitis    DVT, lower extremity    Renal insufficiency    Torn rotator cuff  right shoulder    Hemochromatosis    Glaucoma    No pertinent past medical history      PSH:  H/O repair of right rotator cuff    Bilateral inguinal hernia    No significant past surgical history      Home Medications: seliquis, sertralline, bupropion, zlepon, methocarb, oxycodone      ALL: NKDA      FMH: non contributory    SOC Hx:   Denies etoh, tobacco, or drug use     Physical Exam:   Gen: well appearing male, NAD  Neuro: AOx3, EOMI, PERRLA, no gross deficit on exam  HEENT: NCAT  RESP: CTAB, Non labored  CVS: RRR  GI: abd soft, NT, ND, no rebound/guarding  Extremities: 2+ femoral, DP/PT pulses b/l. no lower extremity edema b/l. non tender, normal motor, strength, sensation      LABS:  cret                        14.6   6.37  )-----------( 215      ( 08 Oct 2020 11:42 )             46.2     10-08    135  |  98  |  15.0  ----------------------------<  158<H>  4.5   |  25.0  |  1.13    Ca    9.3      08 Oct 2020 11:42    TPro  7.6  /  Alb  4.2  /  TBili  0.2<L>  /  DBili  x   /  AST  32  /  ALT  33  /  AlkPhos  97  10-08    PT/INR - ( 08 Oct 2020 11:42 )   PT: 15.0 sec;   INR: 1.31 ratio         PTT - ( 08 Oct 2020 11:42 )  PTT:34.2 sec            < from: CT Angio Chest w/ IV Cont (10.08.20 @ 13:12) >  IMPRESSION:  No evidence of a embolism.    < end of copied text >

## 2020-10-08 NOTE — ED STATDOCS - ADDITIONAL NOTES AND INSTRUCTIONS:
PT was evaluated At [facility] ED and was found to have a condition that warranted time of to rest and heal from WORK/SCHOOL.   Nithin Lawton PA-C

## 2020-10-08 NOTE — ED STATDOCS - PROGRESS NOTE DETAILS
Nithin Lawton PA-C:   Pt discussed at length with attending HPI/ROS/PE confirmed/ {t seen resting comfortable in no acute distress in chair.   PLAN: PT with stable VS, no acute distress, non toxic appearing, tolerating PO in the ED, Pt with neg trop and BNP, no acute PE, resolution of symptoms with minimal medical intervention, Pt seen by maritza killian, recommend continuation of Eliquis as prescribed by OSH, Pt to be dc home with supportive care follow up to out pt surgeon, continue all home meds, educated about when to return to the ED if needed. PT verbalizes that he understands all instructions and results. Pt informed that ED is open and available 24/7 365 days a yr, encouraged to return to the ED if they have any change in condition, or feel the need for revaluation.

## 2020-10-08 NOTE — ED STATDOCS - PATIENT PORTAL LINK FT
You can access the FollowMyHealth Patient Portal offered by [facility] by registering at the following website: [email or web address]. By joining [facility]’s FollowMyHealth portal, you will also be able to view your health information using other applications (apps) compatible with our system.

## 2020-10-08 NOTE — ED ADULT NURSE REASSESSMENT NOTE - NS ED NURSE REASSESS COMMENT FT1
pt placed on cardiac monitor, NSR @ 61. pt in no apparent distress, offers no complaints at this time. will continue to monitor.

## 2020-12-23 PROBLEM — Z12.11 ENCOUNTER FOR SCREENING COLONOSCOPY: Status: RESOLVED | Noted: 2019-10-15 | Resolved: 2020-12-23

## 2021-02-16 ENCOUNTER — APPOINTMENT (OUTPATIENT)
Dept: GASTROENTEROLOGY | Facility: CLINIC | Age: 51
End: 2021-02-16

## 2021-03-05 ENCOUNTER — APPOINTMENT (OUTPATIENT)
Dept: GASTROENTEROLOGY | Facility: CLINIC | Age: 51
End: 2021-03-05
Payer: MEDICAID

## 2021-03-05 VITALS
BODY MASS INDEX: 31.1 KG/M2 | HEART RATE: 70 BPM | SYSTOLIC BLOOD PRESSURE: 152 MMHG | OXYGEN SATURATION: 98 % | RESPIRATION RATE: 15 BRPM | DIASTOLIC BLOOD PRESSURE: 93 MMHG | WEIGHT: 210 LBS | TEMPERATURE: 97.4 F | HEIGHT: 69 IN

## 2021-03-05 DIAGNOSIS — R14.2 ERUCTATION: ICD-10-CM

## 2021-03-05 PROCEDURE — 99072 ADDL SUPL MATRL&STAF TM PHE: CPT

## 2021-03-05 PROCEDURE — 99213 OFFICE O/P EST LOW 20 MIN: CPT

## 2021-03-05 NOTE — HISTORY OF PRESENT ILLNESS
[de-identified] : Patient presents for evaluation for worsening GERD symptoms and excessive eructation.  The onset of symptoms is unclear, and there are no clear alleviating or exacerbating factors.  He has never undergone an EGD.  Denies any unintentional weight loss or melena.

## 2021-03-05 NOTE — ASSESSMENT
[FreeTextEntry1] : The patient is currently on Eliquis for a history of a DVT and PE, which she has been told will be lifelong therapy.  He has no alarm symptoms, and given his anticoagulation status and having experienced a PE within the last year, I would like to hold off on an EGD at this time and will instead empirically treat with pantoprazole 40 mg once daily.  We will follow up with me in 6 weeks at which time we can readdress the possibility of doing an EGD.

## 2021-03-05 NOTE — PHYSICAL EXAM
[General Appearance - Alert] : alert [General Appearance - In No Acute Distress] : in no acute distress [Sclera] : the sclera and conjunctiva were normal [PERRL With Normal Accommodation] : pupils were equal in size, round, and reactive to light [Extraocular Movements] : extraocular movements were intact [Outer Ear] : the ears and nose were normal in appearance [Hearing Threshold Finger Rub Not [place]] : hearing was normal [Neck Appearance] : the appearance of the neck was normal [Neck Cervical Mass (___cm)] : no neck mass was observed [Jugular Venous Distention Increased] : there was no jugular-venous distention [Thyroid Diffuse Enlargement] : the thyroid was not enlarged [Thyroid Nodule] : there were no palpable thyroid nodules [Auscultation Breath Sounds / Voice Sounds] : lungs were clear to auscultation bilaterally [Heart Rate And Rhythm] : heart rate was normal and rhythm regular [Heart Sounds] : normal S1 and S2 [Heart Sounds Gallop] : no gallops [Murmurs] : no murmurs [Heart Sounds Pericardial Friction Rub] : no pericardial rub [Edema] : there was no peripheral edema [Bowel Sounds] : normal bowel sounds [Abdomen Soft] : soft [Abdomen Tenderness] : non-tender [] : no hepato-splenomegaly [Abdomen Mass (___ Cm)] : no abdominal mass palpated [Cervical Lymph Nodes Enlarged Posterior Bilaterally] : posterior cervical [Cervical Lymph Nodes Enlarged Anterior Bilaterally] : anterior cervical [Supraclavicular Lymph Nodes Enlarged Bilaterally] : supraclavicular [Nail Clubbing] : no clubbing  or cyanosis of the fingernails [Skin Color & Pigmentation] : normal skin color and pigmentation [Skin Turgor] : normal skin turgor [No Focal Deficits] : no focal deficits [Oriented To Time, Place, And Person] : oriented to person, place, and time [Impaired Insight] : insight and judgment were intact [Affect] : the affect was normal

## 2021-04-14 ENCOUNTER — APPOINTMENT (OUTPATIENT)
Dept: GASTROENTEROLOGY | Facility: CLINIC | Age: 51
End: 2021-04-14
Payer: MEDICAID

## 2021-04-14 VITALS
WEIGHT: 216 LBS | TEMPERATURE: 98.2 F | SYSTOLIC BLOOD PRESSURE: 120 MMHG | BODY MASS INDEX: 31.99 KG/M2 | HEIGHT: 69 IN | RESPIRATION RATE: 15 BRPM | DIASTOLIC BLOOD PRESSURE: 80 MMHG

## 2021-04-14 DIAGNOSIS — R12 HEARTBURN: ICD-10-CM

## 2021-04-14 PROCEDURE — 99072 ADDL SUPL MATRL&STAF TM PHE: CPT

## 2021-04-14 PROCEDURE — 99212 OFFICE O/P EST SF 10 MIN: CPT

## 2021-04-14 RX ORDER — PANTOPRAZOLE 40 MG/1
40 TABLET, DELAYED RELEASE ORAL
Qty: 90 | Refills: 3 | Status: ACTIVE | COMMUNITY
Start: 2021-03-05 | End: 1900-01-01

## 2021-04-14 NOTE — ASSESSMENT
[FreeTextEntry1] : Given his robust response to pantoprazole, I feel comfortable not doing an EGD at this time.  I have refilled the pantoprazole and advised him to follow-up in 6 months.

## 2021-04-14 NOTE — PHYSICAL EXAM
[General Appearance - Alert] : alert [General Appearance - In No Acute Distress] : in no acute distress [Sclera] : the sclera and conjunctiva were normal [PERRL With Normal Accommodation] : pupils were equal in size, round, and reactive to light [Extraocular Movements] : extraocular movements were intact [Outer Ear] : the ears and nose were normal in appearance [Hearing Threshold Finger Rub Not [place]] : hearing was normal [Neck Appearance] : the appearance of the neck was normal [Neck Cervical Mass (___cm)] : no neck mass was observed [Jugular Venous Distention Increased] : there was no jugular-venous distention [Thyroid Diffuse Enlargement] : the thyroid was not enlarged [Thyroid Nodule] : there were no palpable thyroid nodules [Auscultation Breath Sounds / Voice Sounds] : lungs were clear to auscultation bilaterally [Heart Rate And Rhythm] : heart rate was normal and rhythm regular [Heart Sounds] : normal S1 and S2 [Heart Sounds Gallop] : no gallops [Murmurs] : no murmurs [Heart Sounds Pericardial Friction Rub] : no pericardial rub [Edema] : there was no peripheral edema [Bowel Sounds] : normal bowel sounds [Abdomen Soft] : soft [Abdomen Tenderness] : non-tender [] : no hepato-splenomegaly [Abdomen Mass (___ Cm)] : no abdominal mass palpated [Cervical Lymph Nodes Enlarged Posterior Bilaterally] : posterior cervical [Cervical Lymph Nodes Enlarged Anterior Bilaterally] : anterior cervical [Supraclavicular Lymph Nodes Enlarged Bilaterally] : supraclavicular [Nail Clubbing] : no clubbing  or cyanosis of the fingernails [Skin Color & Pigmentation] : normal skin color and pigmentation [Skin Turgor] : normal skin turgor [No Focal Deficits] : no focal deficits [Oriented To Time, Place, And Person] : oriented to person, place, and time [Impaired Insight] : insight and judgment were intact [Affect] : the affect was normal

## 2021-04-14 NOTE — HISTORY OF PRESENT ILLNESS
[de-identified] : Patient reports resolution of heartburn symptoms since starting the pantoprazole.  He is very pleased with the results.  He does not want to undergo an EGD at this time.
sister will come back to  patient

## 2021-06-30 NOTE — ED STATDOCS - CARDIOVASCULAR [+], MLM
Nutrition trigger received for unable to complete MST screen. Chart reviewed and determined there are no nutrition interventions needed at this time. Nutrition services will sign off. Please consult nutrition services if further intervention needed.     PALPITATIONS

## 2021-09-10 NOTE — ED PROVIDER NOTE - PATIENT PORTAL LINK FT
Patient made aware of 24/7 emergency services. You can access the FollowMyHealth Patient Portal offered by Health system by registering at the following website: [email or web address]. By joining [employer]’s FollowMyHealth portal, you will also be able to view your health information using other applications (apps) compatible with our system.

## 2021-12-29 NOTE — BRIEF OPERATIVE NOTE - ASSISTANT(S)
Senior Purposeful Rounding     Position:Repositions Self     Physical Environment:Room free from clutter, Clear path to bathroom , Adequate lighting and No safety hazards noted     Pain Rating/ Nonverbal Pain Behaviors: back pain     Pain interventions Attempted: prn ibuprofen     Patient Toileted:Independent Alem

## 2023-01-18 ENCOUNTER — OFFICE (OUTPATIENT)
Dept: URBAN - METROPOLITAN AREA CLINIC 6 | Facility: CLINIC | Age: 53
Setting detail: OPHTHALMOLOGY
End: 2023-01-18
Payer: MEDICARE

## 2023-01-18 DIAGNOSIS — H40.1131: ICD-10-CM

## 2023-01-18 DIAGNOSIS — H11.153: ICD-10-CM

## 2023-01-18 DIAGNOSIS — H25.13: ICD-10-CM

## 2023-01-18 PROCEDURE — 99214 OFFICE O/P EST MOD 30 MIN: CPT | Performed by: OPHTHALMOLOGY

## 2023-01-18 ASSESSMENT — REFRACTION_MANIFEST
OS_VA1: 20/20-
OS_SPHERE: -0.75
OS_SPHERE: -0.50
OD_ADD: +2.50
OS_CYLINDER: -1.75
OD_SPHERE: -1.00
OD_CYLINDER: -1.00
OS_VA1: 20/20
OD_AXIS: 105
OS_CYLINDER: -1.25
OS_AXIS: 105
OD_VA1: 20/20
OS_AXIS: 110
OS_ADD: +2.50
OU_VA: 20/20
OD_AXIS: 105
OD_SPHERE: -1.25
OD_VA1: 20/20
OD_CYLINDER: -1.25

## 2023-01-18 ASSESSMENT — PACHYMETRY
OD_CT_UM: 497
OS_CT_CORRECTION: 4
OD_CT_CORRECTION: 4
OS_CT_UM: 491

## 2023-01-18 ASSESSMENT — TONOMETRY
OD_IOP_MMHG: 13
OS_IOP_MMHG: 12

## 2023-01-18 ASSESSMENT — REFRACTION_CURRENTRX
OD_VPRISM_DIRECTION: SV
OD_OVR_VA: 20/
OS_AXIS: 105
OD_AXIS: 103
OS_VPRISM_DIRECTION: SV
OD_SPHERE: -1.00
OS_SPHERE: -0.50
OD_CYLINDER: -1.50
OS_CYLINDER: -1.25
OS_OVR_VA: 20/

## 2023-01-18 ASSESSMENT — AXIALLENGTH_DERIVED
OS_AL: 33.37
OD_AL: 23.3523
OD_AL: 23.4481
OS_AL: 33.67
OD_AL: 23.4001
OS_AL: 33.28

## 2023-01-18 ASSESSMENT — VISUAL ACUITY
OD_BCVA: 20/20
OS_BCVA: 20/20

## 2023-01-18 ASSESSMENT — SPHEQUIV_DERIVED
OD_SPHEQUIV: -1.5
OD_SPHEQUIV: -1.625
OD_SPHEQUIV: -1.75
OS_SPHEQUIV: -1.25
OS_SPHEQUIV: -1.125
OS_SPHEQUIV: -1.625

## 2023-01-18 ASSESSMENT — KERATOMETRY
OD_K2POWER_DIOPTERS: 46.25
OS_K1POWER_DIOPTERS: 5.75
OS_K2POWER_DIOPTERS: 46.25
OS_AXISANGLE_DEGREES: 032
METHOD_AUTO_MANUAL: AUTO
OD_AXISANGLE_DEGREES: 684
OD_K1POWER_DIOPTERS: 45.25

## 2023-01-18 ASSESSMENT — CONFRONTATIONAL VISUAL FIELD TEST (CVF)
OS_FINDINGS: FULL
OD_FINDINGS: FULL

## 2023-01-18 ASSESSMENT — REFRACTION_AUTOREFRACTION
OS_SPHERE: -0.50
OD_SPHERE: -1.00
OD_AXIS: 107
OD_CYLINDER: -1.00
OS_CYLINDER: -1.50
OS_AXIS: 111

## 2023-05-26 ENCOUNTER — OFFICE (OUTPATIENT)
Dept: URBAN - METROPOLITAN AREA CLINIC 6 | Facility: CLINIC | Age: 53
Setting detail: OPHTHALMOLOGY
End: 2023-05-26
Payer: MEDICARE

## 2023-05-26 DIAGNOSIS — H25.13: ICD-10-CM

## 2023-05-26 DIAGNOSIS — H40.1131: ICD-10-CM

## 2023-05-26 DIAGNOSIS — H11.153: ICD-10-CM

## 2023-05-26 PROCEDURE — 99214 OFFICE O/P EST MOD 30 MIN: CPT | Performed by: OPHTHALMOLOGY

## 2023-05-26 PROCEDURE — 92083 EXTENDED VISUAL FIELD XM: CPT | Performed by: OPHTHALMOLOGY

## 2023-05-26 PROCEDURE — 92133 CPTRZD OPH DX IMG PST SGM ON: CPT | Performed by: OPHTHALMOLOGY

## 2023-05-26 ASSESSMENT — REFRACTION_MANIFEST
OS_SPHERE: -0.75
OD_AXIS: 105
OD_CYLINDER: -1.00
OS_CYLINDER: -1.25
OS_AXIS: 110
OD_SPHERE: -1.25
OD_VA1: 20/20
OD_SPHERE: -1.00
OD_CYLINDER: -1.25
OS_ADD: +2.50
OS_VA1: 20/20
OS_CYLINDER: -1.75
OD_AXIS: 105
OU_VA: 20/20
OD_VA1: 20/20
OS_VA1: 20/20-
OS_SPHERE: -0.50
OS_AXIS: 105
OD_ADD: +2.50

## 2023-05-26 ASSESSMENT — REFRACTION_CURRENTRX
OD_SPHERE: -1.00
OD_OVR_VA: 20/
OD_VPRISM_DIRECTION: SV
OS_CYLINDER: -1.25
OD_CYLINDER: -1.25
OS_AXIS: 105
OS_OVR_VA: 20/
OS_SPHERE: -0.50
OD_AXIS: 110
OS_VPRISM_DIRECTION: SV

## 2023-05-26 ASSESSMENT — SPHEQUIV_DERIVED
OS_SPHEQUIV: -1.625
OD_SPHEQUIV: -1.75
OS_SPHEQUIV: -1.125
OD_SPHEQUIV: -1.375
OS_SPHEQUIV: -1.125
OD_SPHEQUIV: -1.625

## 2023-05-26 ASSESSMENT — KERATOMETRY
METHOD_AUTO_MANUAL: AUTO
OD_K2POWER_DIOPTERS: 45.75
OS_AXISANGLE_DEGREES: 010
OD_AXISANGLE_DEGREES: 035
OS_K1POWER_DIOPTERS: 44.75
OD_K1POWER_DIOPTERS: 45.25
OS_K2POWER_DIOPTERS: 46.00

## 2023-05-26 ASSESSMENT — REFRACTION_AUTOREFRACTION
OS_SPHERE: -0.25
OS_CYLINDER: -1.75
OD_CYLINDER: -1.25
OD_AXIS: 105
OS_AXIS: 105
OD_SPHERE: -0.75

## 2023-05-26 ASSESSMENT — PACHYMETRY
OD_CT_CORRECTION: 4
OS_CT_UM: 491
OS_CT_CORRECTION: 4
OD_CT_UM: 497

## 2023-05-26 ASSESSMENT — AXIALLENGTH_DERIVED
OD_AL: 23.5392
OD_AL: 23.4908
OD_AL: 23.3947
OS_AL: 23.5364
OS_AL: 23.3441
OS_AL: 23.3441

## 2023-05-26 ASSESSMENT — VISUAL ACUITY
OD_BCVA: 20/25-2
OS_BCVA: 20/25+3

## 2023-05-26 ASSESSMENT — TONOMETRY
OD_IOP_MMHG: 16
OS_IOP_MMHG: 16

## 2023-05-26 ASSESSMENT — CONFRONTATIONAL VISUAL FIELD TEST (CVF)
OD_FINDINGS: FULL
OS_FINDINGS: FULL

## 2023-08-24 NOTE — ASU PATIENT PROFILE, ADULT - PMH
Depression    DVT, lower extremity    GERD without esophagitis    Glaucoma    Hemochromatosis    Renal insufficiency    Torn rotator cuff  right shoulder
Statement Selected

## 2023-09-26 ENCOUNTER — RX ONLY (RX ONLY)
Age: 53
End: 2023-09-26

## 2023-09-26 ENCOUNTER — OFFICE (OUTPATIENT)
Dept: URBAN - METROPOLITAN AREA CLINIC 6 | Facility: CLINIC | Age: 53
Setting detail: OPHTHALMOLOGY
End: 2023-09-26
Payer: MEDICARE

## 2023-09-26 VITALS — HEIGHT: 60 IN

## 2023-09-26 DIAGNOSIS — H40.1131: ICD-10-CM

## 2023-09-26 DIAGNOSIS — H11.153: ICD-10-CM

## 2023-09-26 DIAGNOSIS — H25.13: ICD-10-CM

## 2023-09-26 DIAGNOSIS — E11.9: ICD-10-CM

## 2023-09-26 PROCEDURE — 92014 COMPRE OPH EXAM EST PT 1/>: CPT | Performed by: OPHTHALMOLOGY

## 2023-09-26 PROCEDURE — 92250 FUNDUS PHOTOGRAPHY W/I&R: CPT | Performed by: OPHTHALMOLOGY

## 2023-09-26 ASSESSMENT — REFRACTION_MANIFEST
OS_SPHERE: -0.50
OD_VA1: 20/20
OD_AXIS: 105
OD_SPHERE: -1.25
OD_ADD: +2.50
OS_ADD: +2.50
OD_CYLINDER: -1.25
OS_AXIS: 105
OS_CYLINDER: -1.75
OS_CYLINDER: -1.25
OD_CYLINDER: -1.00
OU_VA: 20/20
OS_AXIS: 110
OD_VA1: 20/20
OD_SPHERE: -1.00
OS_VA1: 20/20-
OD_AXIS: 105
OS_VA1: 20/20
OS_SPHERE: -0.75

## 2023-09-26 ASSESSMENT — REFRACTION_CURRENTRX
OD_SPHERE: -1.00
OS_SPHERE: -0.50
OS_VPRISM_DIRECTION: SV
OD_AXIS: 110
OS_CYLINDER: -1.25
OD_OVR_VA: 20/
OS_AXIS: 105
OD_VPRISM_DIRECTION: SV
OS_OVR_VA: 20/
OD_CYLINDER: -1.25

## 2023-09-26 ASSESSMENT — CONFRONTATIONAL VISUAL FIELD TEST (CVF)
OS_FINDINGS: FULL
OD_FINDINGS: FULL

## 2023-09-26 ASSESSMENT — REFRACTION_AUTOREFRACTION
OS_SPHERE: -1.25
OD_CYLINDER: -0.75
OS_AXIS: 123
OD_AXIS: 112
OD_SPHERE: -1.50
OS_CYLINDER: -0.75

## 2023-09-26 ASSESSMENT — AXIALLENGTH_DERIVED
OS_AL: 23.2546
OD_AL: 23.3551
OD_AL: 23.4509
OS_AL: 23.4454
OS_AL: 23.4454
OD_AL: 23.4029

## 2023-09-26 ASSESSMENT — KERATOMETRY
OD_K1POWER_DIOPTERS: 45.25
OS_K2POWER_DIOPTERS: 46.00
OS_K1POWER_DIOPTERS: 45.25
OS_AXISANGLE_DEGREES: 081
OD_AXISANGLE_DEGREES: 081
OD_K2POWER_DIOPTERS: 46.50
METHOD_AUTO_MANUAL: AUTO

## 2023-09-26 ASSESSMENT — SPHEQUIV_DERIVED
OS_SPHEQUIV: -1.625
OD_SPHEQUIV: -1.625
OS_SPHEQUIV: -1.625
OS_SPHEQUIV: -1.125
OD_SPHEQUIV: -1.875
OD_SPHEQUIV: -1.75

## 2023-09-26 ASSESSMENT — TONOMETRY
OS_IOP_MMHG: 14
OD_IOP_MMHG: 16

## 2023-09-26 ASSESSMENT — VISUAL ACUITY
OS_BCVA: 20/25-1
OD_BCVA: 20/25-2

## 2023-09-26 ASSESSMENT — PACHYMETRY
OD_CT_UM: 497
OD_CT_CORRECTION: 4
OS_CT_CORRECTION: 4
OS_CT_UM: 491

## 2024-01-30 ENCOUNTER — OFFICE (OUTPATIENT)
Dept: URBAN - METROPOLITAN AREA CLINIC 6 | Facility: CLINIC | Age: 54
Setting detail: OPHTHALMOLOGY
End: 2024-01-30
Payer: MEDICARE

## 2024-01-30 DIAGNOSIS — H25.13: ICD-10-CM

## 2024-01-30 DIAGNOSIS — H11.153: ICD-10-CM

## 2024-01-30 DIAGNOSIS — E11.9: ICD-10-CM

## 2024-01-30 DIAGNOSIS — H40.1131: ICD-10-CM

## 2024-01-30 PROCEDURE — 99214 OFFICE O/P EST MOD 30 MIN: CPT | Performed by: OPHTHALMOLOGY

## 2024-01-30 ASSESSMENT — REFRACTION_MANIFEST
OD_ADD: +2.50
OS_CYLINDER: -1.75
OD_CYLINDER: -1.25
OS_AXIS: 110
OD_CYLINDER: -1.00
OS_SPHERE: -0.75
OS_SPHERE: -0.50
OS_VA1: 20/20
OS_AXIS: 105
OS_CYLINDER: -1.25
OD_SPHERE: -1.00
OD_VA1: 20/20
OD_AXIS: 105
OS_ADD: +2.50
OU_VA: 20/20
OD_SPHERE: -1.25
OD_VA1: 20/20
OD_AXIS: 105
OS_VA1: 20/20-

## 2024-01-30 ASSESSMENT — REFRACTION_AUTOREFRACTION
OS_CYLINDER: -0.75
OD_AXIS: 112
OS_SPHERE: -1.25
OS_AXIS: 123
OD_CYLINDER: -0.75
OD_SPHERE: -1.50

## 2024-01-30 ASSESSMENT — SPHEQUIV_DERIVED
OS_SPHEQUIV: -1.625
OD_SPHEQUIV: -1.75
OS_SPHEQUIV: -1.625
OD_SPHEQUIV: -1.875
OD_SPHEQUIV: -1.625
OS_SPHEQUIV: -1.125

## 2024-01-30 ASSESSMENT — REFRACTION_CURRENTRX
OD_SPHERE: -1.00
OS_VPRISM_DIRECTION: SV
OD_AXIS: 110
OD_OVR_VA: 20/
OS_CYLINDER: -1.25
OD_CYLINDER: -1.25
OS_SPHERE: -0.50
OS_OVR_VA: 20/
OD_VPRISM_DIRECTION: SV
OS_AXIS: 105

## 2024-01-30 ASSESSMENT — CONFRONTATIONAL VISUAL FIELD TEST (CVF)
OD_FINDINGS: FULL
OS_FINDINGS: FULL

## 2024-11-13 NOTE — ED ADULT TRIAGE NOTE - HEART RATE (BEATS/MIN)
Is This A New Presentation, Or A Follow-Up?: Skin Lesion
How Severe Is Your Skin Lesion?: mild
What Type Of Note Output Would You Prefer (Optional)?: Bullet Format
69